# Patient Record
Sex: FEMALE | Race: WHITE | NOT HISPANIC OR LATINO
[De-identification: names, ages, dates, MRNs, and addresses within clinical notes are randomized per-mention and may not be internally consistent; named-entity substitution may affect disease eponyms.]

---

## 2017-12-06 ENCOUNTER — LABORATORY RESULT (OUTPATIENT)
Age: 57
End: 2017-12-06

## 2017-12-08 ENCOUNTER — APPOINTMENT (OUTPATIENT)
Dept: INTERNAL MEDICINE | Facility: CLINIC | Age: 57
End: 2017-12-08
Payer: COMMERCIAL

## 2017-12-08 VITALS
OXYGEN SATURATION: 96 % | HEART RATE: 55 BPM | DIASTOLIC BLOOD PRESSURE: 60 MMHG | HEIGHT: 58 IN | TEMPERATURE: 97.5 F | WEIGHT: 120 LBS | SYSTOLIC BLOOD PRESSURE: 100 MMHG | BODY MASS INDEX: 25.19 KG/M2

## 2017-12-08 DIAGNOSIS — Z83.511 FAMILY HISTORY OF GLAUCOMA: ICD-10-CM

## 2017-12-08 DIAGNOSIS — Z78.9 OTHER SPECIFIED HEALTH STATUS: ICD-10-CM

## 2017-12-08 DIAGNOSIS — Z87.891 PERSONAL HISTORY OF NICOTINE DEPENDENCE: ICD-10-CM

## 2017-12-08 LAB
HCV AB SER QL: NONREACTIVE
HCV S/CO RATIO: 0.09 S/CO
T4 FREE SERPL-MCNC: 1 NG/DL

## 2017-12-08 PROCEDURE — 99396 PREV VISIT EST AGE 40-64: CPT | Mod: 25

## 2017-12-08 PROCEDURE — 93000 ELECTROCARDIOGRAM COMPLETE: CPT

## 2017-12-08 RX ORDER — MULTIVITAMIN
TABLET ORAL
Refills: 0 | Status: ACTIVE | COMMUNITY

## 2017-12-08 RX ORDER — CALCIUM CARBONATE/VITAMIN D3 600MG-5MCG
600-200 TABLET ORAL
Refills: 0 | Status: ACTIVE | COMMUNITY

## 2017-12-20 ENCOUNTER — APPOINTMENT (OUTPATIENT)
Dept: ULTRASOUND IMAGING | Facility: CLINIC | Age: 57
End: 2017-12-20
Payer: COMMERCIAL

## 2017-12-20 ENCOUNTER — OUTPATIENT (OUTPATIENT)
Dept: OUTPATIENT SERVICES | Facility: HOSPITAL | Age: 57
LOS: 1 days | End: 2017-12-20
Payer: COMMERCIAL

## 2017-12-20 DIAGNOSIS — Z00.8 ENCOUNTER FOR OTHER GENERAL EXAMINATION: ICD-10-CM

## 2017-12-20 PROCEDURE — 76641 ULTRASOUND BREAST COMPLETE: CPT | Mod: 26,50

## 2017-12-20 PROCEDURE — 76641 ULTRASOUND BREAST COMPLETE: CPT

## 2018-07-03 ENCOUNTER — APPOINTMENT (OUTPATIENT)
Dept: INTERNAL MEDICINE | Facility: CLINIC | Age: 58
End: 2018-07-03
Payer: COMMERCIAL

## 2018-07-03 VITALS
BODY MASS INDEX: 24.14 KG/M2 | SYSTOLIC BLOOD PRESSURE: 130 MMHG | TEMPERATURE: 98 F | HEIGHT: 58 IN | DIASTOLIC BLOOD PRESSURE: 80 MMHG | WEIGHT: 115 LBS

## 2018-07-03 PROCEDURE — 99214 OFFICE O/P EST MOD 30 MIN: CPT

## 2018-07-25 PROBLEM — Z78.9 ALCOHOL USE: Status: ACTIVE | Noted: 2017-12-08

## 2020-03-04 ENCOUNTER — APPOINTMENT (OUTPATIENT)
Dept: INTERNAL MEDICINE | Facility: CLINIC | Age: 60
End: 2020-03-04
Payer: COMMERCIAL

## 2020-03-04 VITALS
HEIGHT: 58 IN | BODY MASS INDEX: 25.61 KG/M2 | TEMPERATURE: 97.6 F | SYSTOLIC BLOOD PRESSURE: 108 MMHG | WEIGHT: 122 LBS | DIASTOLIC BLOOD PRESSURE: 76 MMHG | OXYGEN SATURATION: 98 % | HEART RATE: 73 BPM

## 2020-03-04 DIAGNOSIS — Z11.59 ENCOUNTER FOR SCREENING FOR OTHER VIRAL DISEASES: ICD-10-CM

## 2020-03-04 DIAGNOSIS — Z87.828 PERSONAL HISTORY OF OTHER (HEALED) PHYSICAL INJURY AND TRAUMA: ICD-10-CM

## 2020-03-04 DIAGNOSIS — O24.419 GESTATIONAL DIABETES MELLITUS IN PREGNANCY, UNSPECIFIED CONTROL: ICD-10-CM

## 2020-03-04 DIAGNOSIS — Z86.32 PERSONAL HISTORY OF GESTATIONAL DIABETES: ICD-10-CM

## 2020-03-04 PROCEDURE — 93000 ELECTROCARDIOGRAM COMPLETE: CPT

## 2020-03-04 PROCEDURE — 99214 OFFICE O/P EST MOD 30 MIN: CPT | Mod: 25

## 2020-03-04 RX ORDER — MUPIROCIN 20 MG/G
2 OINTMENT TOPICAL
Qty: 1 | Refills: 0 | Status: DISCONTINUED | COMMUNITY
Start: 2017-12-08 | End: 2020-03-04

## 2020-03-04 RX ORDER — NAPROXEN 500 MG/1
500 TABLET ORAL
Qty: 30 | Refills: 0 | Status: DISCONTINUED | COMMUNITY
Start: 2018-07-03 | End: 2020-03-04

## 2020-03-04 NOTE — PLAN
[FreeTextEntry1] : pt will call me if not better or if worse .\par EKG- sinus mary grace 56\par palp- pt will observe and see when she dev palpitation. if more freq- pt to do holter.  pt to stop caffeine. \par f/u for CPE

## 2020-03-04 NOTE — HISTORY OF PRESENT ILLNESS
[Moderate] : moderate [Episodic] : episodic  [Cough] : cough [At Night] : at night [Improving] : improving [Congestion] : no congestion [Wheezing] : no wheezing [Sore Throat] : no sore throat [Chills] : no chills [Shortness Of Breath] : no shortness of breath [Earache] : no earache [Fever] : no fever [Fatigue] : not fatigue [FreeTextEntry1] : 2 mo [de-identified] : dry cough but occasionally productive [FreeTextEntry6] : 90% [FreeTextEntry5] : flonase [FreeTextEntry8] : pt was tx for the flu 12/19- just before [place].  not tx w flu\par cough lingered after flu.  but has continued to improve.  pt returned to - 5 wk( early FEB) after the flu and tx w flovent- took for 3-4 days , flonase- took it for 1 wk\par over the past 2 days- slightly worse- tickle in the throat. no nasal congestion\par no fever\par no GERD .  has post nasal drip\par pt c/o heart racing- 2mo- lasting less than a min.   better after stopping wine.  occurs every few days.  no assoc CP or SOB\par pt not sure if heart racing is assoc w hot flashes.

## 2020-03-04 NOTE — HISTORY OF PRESENT ILLNESS
[Moderate] : moderate [Episodic] : episodic  [Cough] : cough [At Night] : at night [Improving] : improving [Congestion] : no congestion [Wheezing] : no wheezing [Chills] : no chills [Sore Throat] : no sore throat [Earache] : no earache [Shortness Of Breath] : no shortness of breath [Fatigue] : not fatigue [Fever] : no fever [FreeTextEntry1] : 2 mo [de-identified] : dry cough but occasionally productive [FreeTextEntry5] : flonase [FreeTextEntry6] : 90% [FreeTextEntry8] : pt was tx for the flu 12/19- just before [place].  not tx w flu\par cough lingered after flu.  but has continued to improve.  pt returned to - 5 wk( early FEB) after the flu and tx w flovent- took for 3-4 days , flonase- took it for 1 wk\par over the past 2 days- slightly worse- tickle in the throat. no nasal congestion\par no fever\par no GERD .  has post nasal drip\par pt c/o heart racing- 2mo- lasting less than a min.   better after stopping wine.  occurs every few days.  no assoc CP or SOB\par pt not sure if heart racing is assoc w hot flashes.

## 2020-03-05 LAB
25(OH)D3 SERPL-MCNC: 36 NG/ML
ALBUMIN SERPL ELPH-MCNC: 4.6 G/DL
ALP BLD-CCNC: 61 U/L
ALT SERPL-CCNC: 16 U/L
ANION GAP SERPL CALC-SCNC: 12 MMOL/L
AST SERPL-CCNC: 19 U/L
BASOPHILS # BLD AUTO: 0.03 K/UL
BASOPHILS NFR BLD AUTO: 0.7 %
BILIRUB SERPL-MCNC: 0.8 MG/DL
BUN SERPL-MCNC: 16 MG/DL
CALCIUM SERPL-MCNC: 9.6 MG/DL
CHLORIDE SERPL-SCNC: 103 MMOL/L
CHOLEST SERPL-MCNC: 204 MG/DL
CHOLEST/HDLC SERPL: 2.6 RATIO
CO2 SERPL-SCNC: 27 MMOL/L
CREAT SERPL-MCNC: 0.75 MG/DL
EOSINOPHIL # BLD AUTO: 0.18 K/UL
EOSINOPHIL NFR BLD AUTO: 4.2 %
GLUCOSE SERPL-MCNC: 97 MG/DL
HCT VFR BLD CALC: 41.4 %
HDLC SERPL-MCNC: 80 MG/DL
HGB BLD-MCNC: 13.2 G/DL
IMM GRANULOCYTES NFR BLD AUTO: 0.2 %
LDLC SERPL CALC-MCNC: 112 MG/DL
LYMPHOCYTES # BLD AUTO: 1.19 K/UL
LYMPHOCYTES NFR BLD AUTO: 27.5 %
MAN DIFF?: NORMAL
MCHC RBC-ENTMCNC: 31.2 PG
MCHC RBC-ENTMCNC: 31.9 GM/DL
MCV RBC AUTO: 97.9 FL
MONOCYTES # BLD AUTO: 0.37 K/UL
MONOCYTES NFR BLD AUTO: 8.6 %
NEUTROPHILS # BLD AUTO: 2.54 K/UL
NEUTROPHILS NFR BLD AUTO: 58.8 %
PLATELET # BLD AUTO: 266 K/UL
POTASSIUM SERPL-SCNC: 4.5 MMOL/L
PROT SERPL-MCNC: 6.7 G/DL
RBC # BLD: 4.23 M/UL
RBC # FLD: 12 %
SAVE SPECIMEN: NORMAL
SODIUM SERPL-SCNC: 142 MMOL/L
T3 SERPL-MCNC: 91 NG/DL
T4 FREE SERPL-MCNC: 1.1 NG/DL
TRIGL SERPL-MCNC: 59 MG/DL
TSH SERPL-ACNC: 1.56 UIU/ML
WBC # FLD AUTO: 4.32 K/UL

## 2020-03-10 ENCOUNTER — APPOINTMENT (OUTPATIENT)
Dept: INTERNAL MEDICINE | Facility: CLINIC | Age: 60
End: 2020-03-10
Payer: COMMERCIAL

## 2020-03-10 VITALS — SYSTOLIC BLOOD PRESSURE: 120 MMHG | DIASTOLIC BLOOD PRESSURE: 80 MMHG

## 2020-03-10 VITALS
OXYGEN SATURATION: 98 % | TEMPERATURE: 97.5 F | SYSTOLIC BLOOD PRESSURE: 140 MMHG | BODY MASS INDEX: 25.61 KG/M2 | WEIGHT: 122 LBS | HEART RATE: 65 BPM | DIASTOLIC BLOOD PRESSURE: 80 MMHG | HEIGHT: 58 IN

## 2020-03-10 DIAGNOSIS — Z91.89 OTHER SPECIFIED PERSONAL RISK FACTORS, NOT ELSEWHERE CLASSIFIED: ICD-10-CM

## 2020-03-10 DIAGNOSIS — M79.672 PAIN IN LEFT FOOT: ICD-10-CM

## 2020-03-10 DIAGNOSIS — R09.82 POSTNASAL DRIP: ICD-10-CM

## 2020-03-10 DIAGNOSIS — R05 COUGH: ICD-10-CM

## 2020-03-10 DIAGNOSIS — M25.512 PAIN IN LEFT SHOULDER: ICD-10-CM

## 2020-03-10 PROCEDURE — 94010 BREATHING CAPACITY TEST: CPT

## 2020-03-10 PROCEDURE — 99396 PREV VISIT EST AGE 40-64: CPT | Mod: 25

## 2020-03-10 NOTE — PHYSICAL EXAM
[No Acute Distress] : no acute distress [Well Nourished] : well nourished [Well Developed] : well developed [Well-Appearing] : well-appearing [Normal Sclera/Conjunctiva] : normal sclera/conjunctiva [PERRL] : pupils equal round and reactive to light [Normal Outer Ear/Nose] : the outer ears and nose were normal in appearance [Normal Oropharynx] : the oropharynx was normal [Normal TMs] : both tympanic membranes were normal [No Lymphadenopathy] : no lymphadenopathy [Supple] : supple [Thyroid Normal, No Nodules] : the thyroid was normal and there were no nodules present [No Respiratory Distress] : no respiratory distress  [No Accessory Muscle Use] : no accessory muscle use [Clear to Auscultation] : lungs were clear to auscultation bilaterally [Normal Rate] : normal rate  [Regular Rhythm] : with a regular rhythm [Normal S1, S2] : normal S1 and S2 [No Murmur] : no murmur heard [No Carotid Bruits] : no carotid bruits [No Edema] : there was no peripheral edema [Soft] : abdomen soft [Non Tender] : non-tender [Non-distended] : non-distended [Normal Bowel Sounds] : normal bowel sounds [Normal Supraclavicular Nodes] : no supraclavicular lymphadenopathy [Normal Axillary Nodes] : no axillary lymphadenopathy [Normal Posterior Cervical Nodes] : no posterior cervical lymphadenopathy [Normal Anterior Cervical Nodes] : no anterior cervical lymphadenopathy [Normal Inguinal Nodes] : no inguinal lymphadenopathy [Grossly Normal Strength/Tone] : grossly normal strength/tone [No Focal Deficits] : no focal deficits [Normal Gait] : normal gait [Normal Affect] : the affect was normal [Normal Insight/Judgement] : insight and judgment were intact [Normal Appearance] : normal in appearance [No Masses] : no palpable masses [No Axillary Lymphadenopathy] : no axillary lymphadenopathy

## 2020-03-10 NOTE — HISTORY OF PRESENT ILLNESS
[FreeTextEntry1] : CPE [de-identified] : vaccine- tetanus 2012- as per pt.  pt had flu.  \par diet- reviewed healthy diet. \par exercise- yoga- walking -no CP or SOB\par cough- sporadic.  post nasal drip.  stopped flonase due to epistaxis.  better w singular. - started 1 wk ago\par less congestion on singular.\par pt denies GERD symptoms\par palpitation resolved w stopping wine

## 2020-03-10 NOTE — HEALTH RISK ASSESSMENT
[Yes] : Yes [4 or more  times a week (4 pts)] : 4 or more  times a week (4 points) [1 or 2 (0 pts)] : 1 or 2 (0 points) [Never (0 pts)] : Never (0 points) [No] : In the past 12 months have you used drugs other than those required for medical reasons? No [0] : 2) Feeling down, depressed, or hopeless: Not at all (0) [Patient reported mammogram was normal] : Patient reported mammogram was normal [Patient reported PAP Smear was normal] : Patient reported PAP Smear was normal [Patient reported bone density results were abnormal] : Patient reported bone density results were abnormal [HIV test declined] : HIV test declined [With Patient/Caregiver] : With Patient/Caregiver [Designated Healthcare Proxy] : Designated healthcare proxy [Relationship: ___] : Relationship: [unfilled] [] : No [Audit-CScore] : 4 [MammogramDate] : 10/18 [MammogramComments] : as per pt [PapSmearDate] : 10/18 [PapSmearComments] : as per ptpt will make f/u w her GYN [BoneDensityDate] : 10/16 [BoneDensityComments] : osteopenia [ColonoscopyComments] : last colonoscopy- 13 yr ago [de-identified] : see opth 2 yr ago [de-identified] : sees dentist regular [AdvancecareDate] : 03/20

## 2020-04-03 ENCOUNTER — RX RENEWAL (OUTPATIENT)
Age: 60
End: 2020-04-03

## 2021-07-13 ENCOUNTER — NON-APPOINTMENT (OUTPATIENT)
Age: 61
End: 2021-07-13

## 2021-07-13 ENCOUNTER — APPOINTMENT (OUTPATIENT)
Dept: INTERNAL MEDICINE | Facility: CLINIC | Age: 61
End: 2021-07-13
Payer: COMMERCIAL

## 2021-07-13 VITALS
SYSTOLIC BLOOD PRESSURE: 118 MMHG | BODY MASS INDEX: 25.19 KG/M2 | WEIGHT: 120 LBS | TEMPERATURE: 97.5 F | OXYGEN SATURATION: 98 % | DIASTOLIC BLOOD PRESSURE: 76 MMHG | HEIGHT: 58 IN | HEART RATE: 66 BPM

## 2021-07-13 DIAGNOSIS — Z01.84 ENCOUNTER FOR ANTIBODY RESPONSE EXAMINATION: ICD-10-CM

## 2021-07-13 DIAGNOSIS — R00.2 PALPITATIONS: ICD-10-CM

## 2021-07-13 DIAGNOSIS — R23.2 FLUSHING: ICD-10-CM

## 2021-07-13 LAB
25(OH)D3 SERPL-MCNC: 35.1 NG/ML
ALBUMIN SERPL ELPH-MCNC: 4.5 G/DL
ALP BLD-CCNC: 69 U/L
ALT SERPL-CCNC: 16 U/L
ANION GAP SERPL CALC-SCNC: 10 MMOL/L
AST SERPL-CCNC: 19 U/L
BASOPHILS # BLD AUTO: 0.04 K/UL
BASOPHILS NFR BLD AUTO: 0.9 %
BILIRUB SERPL-MCNC: 0.6 MG/DL
BUN SERPL-MCNC: 12 MG/DL
CALCIUM SERPL-MCNC: 9.2 MG/DL
CHLORIDE SERPL-SCNC: 100 MMOL/L
CHOLEST SERPL-MCNC: 217 MG/DL
CO2 SERPL-SCNC: 27 MMOL/L
CREAT SERPL-MCNC: 0.72 MG/DL
EOSINOPHIL # BLD AUTO: 0.16 K/UL
EOSINOPHIL NFR BLD AUTO: 3.5 %
ESTIMATED AVERAGE GLUCOSE: 100 MG/DL
GLUCOSE SERPL-MCNC: 95 MG/DL
HBA1C MFR BLD HPLC: 5.1 %
HCT VFR BLD CALC: 38.2 %
HDLC SERPL-MCNC: 89 MG/DL
HGB BLD-MCNC: 12.7 G/DL
IMM GRANULOCYTES NFR BLD AUTO: 0.2 %
LDLC SERPL CALC-MCNC: 116 MG/DL
LDLC SERPL DIRECT ASSAY-MCNC: 118 MG/DL
LYMPHOCYTES # BLD AUTO: 1 K/UL
LYMPHOCYTES NFR BLD AUTO: 21.8 %
MAN DIFF?: NORMAL
MCHC RBC-ENTMCNC: 32 PG
MCHC RBC-ENTMCNC: 33.2 GM/DL
MCV RBC AUTO: 96.2 FL
MONOCYTES # BLD AUTO: 0.41 K/UL
MONOCYTES NFR BLD AUTO: 9 %
NEUTROPHILS # BLD AUTO: 2.96 K/UL
NEUTROPHILS NFR BLD AUTO: 64.6 %
NONHDLC SERPL-MCNC: 128 MG/DL
PLATELET # BLD AUTO: 268 K/UL
POTASSIUM SERPL-SCNC: 4.9 MMOL/L
PROT SERPL-MCNC: 6.5 G/DL
RBC # BLD: 3.97 M/UL
RBC # FLD: 12.1 %
SAVE SPECIMEN: NORMAL
SODIUM SERPL-SCNC: 137 MMOL/L
T4 FREE SERPL-MCNC: 1 NG/DL
TRIGL SERPL-MCNC: 62 MG/DL
TSH SERPL-ACNC: 1.76 UIU/ML
WBC # FLD AUTO: 4.58 K/UL

## 2021-07-13 PROCEDURE — 99072 ADDL SUPL MATRL&STAF TM PHE: CPT

## 2021-07-13 PROCEDURE — 93000 ELECTROCARDIOGRAM COMPLETE: CPT

## 2021-07-13 PROCEDURE — 99396 PREV VISIT EST AGE 40-64: CPT | Mod: 25

## 2021-07-13 RX ORDER — AZELASTINE HYDROCHLORIDE 137 UG/1
137 SPRAY, METERED NASAL DAILY
Qty: 1 | Refills: 3 | Status: DISCONTINUED | COMMUNITY
Start: 2020-03-10 | End: 2021-07-13

## 2021-07-13 RX ORDER — FLUTICASONE PROPIONATE 50 UG/1
50 SPRAY, METERED NASAL DAILY
Qty: 1 | Refills: 1 | Status: DISCONTINUED | COMMUNITY
Start: 2020-03-04 | End: 2021-07-13

## 2021-07-13 RX ORDER — MONTELUKAST 10 MG/1
10 TABLET, FILM COATED ORAL DAILY
Qty: 1 | Refills: 0 | Status: DISCONTINUED | COMMUNITY
Start: 2020-03-04 | End: 2021-07-13

## 2021-07-13 RX ORDER — ASCORBIC ACID 500 MG
500 TABLET ORAL
Refills: 0 | Status: DISCONTINUED | COMMUNITY
End: 2021-07-13

## 2021-07-13 RX ORDER — LORATADINE 5 MG/5 ML
10 SOLUTION, ORAL ORAL
Qty: 30 | Refills: 0 | Status: DISCONTINUED | COMMUNITY
Start: 2020-03-10 | End: 2021-07-13

## 2021-07-13 NOTE — PHYSICAL EXAM
[No Acute Distress] : no acute distress [Well Nourished] : well nourished [Well Developed] : well developed [Well-Appearing] : well-appearing [Normal Sclera/Conjunctiva] : normal sclera/conjunctiva [PERRL] : pupils equal round and reactive to light [Normal Outer Ear/Nose] : the outer ears and nose were normal in appearance [Normal Oropharynx] : the oropharynx was normal [Normal TMs] : both tympanic membranes were normal [No Lymphadenopathy] : no lymphadenopathy [Supple] : supple [Thyroid Normal, No Nodules] : the thyroid was normal and there were no nodules present [No Respiratory Distress] : no respiratory distress  [No Accessory Muscle Use] : no accessory muscle use [Clear to Auscultation] : lungs were clear to auscultation bilaterally [Normal Rate] : normal rate  [Regular Rhythm] : with a regular rhythm [Normal S1, S2] : normal S1 and S2 [No Murmur] : no murmur heard [No Carotid Bruits] : no carotid bruits [No Edema] : there was no peripheral edema [Soft] : abdomen soft [Non Tender] : non-tender [Non-distended] : non-distended [No Masses] : no abdominal mass palpated [Normal Bowel Sounds] : normal bowel sounds [Normal Supraclavicular Nodes] : no supraclavicular lymphadenopathy [Normal Axillary Nodes] : no axillary lymphadenopathy [Normal Posterior Cervical Nodes] : no posterior cervical lymphadenopathy [Normal Anterior Cervical Nodes] : no anterior cervical lymphadenopathy [Grossly Normal Strength/Tone] : grossly normal strength/tone [No Focal Deficits] : no focal deficits [Normal Gait] : normal gait [Normal Affect] : the affect was normal [Normal Insight/Judgement] : insight and judgment were intact [de-identified] : no mass under the tongue

## 2021-07-13 NOTE — HISTORY OF PRESENT ILLNESS
[FreeTextEntry1] : CPE [de-identified] : vaccine- tetanus 2012- as per pt. pt waiting to get COVID vac  \par diet- reviewed healthy diet. \par exercise-- walking -no CP or SOB\par reviewed 6/12/21 labs\par tinnitus- occasional- hi pitched ringing.  no buzzing or rumbling sound\par pt states she felt fullness under her tongue.pt was evaluated by ENT who thought it was due to dental work.  pt has appt w oral surgeon tomorrow.  pt doesn't feel it today\par palpitation- intermittent. 1-2 yr. usually when she is sleeping. occurs 1/wk.  duration 1 min.  also assoc w hot flashes.\par no assoc SOB, CP or dizziness. 3-4 cups coffee/ day

## 2021-07-13 NOTE — PLAN
[FreeTextEntry1] : palpitation- pt doesn't want to do holter now.  states she wants to wait 1 mo and observe. decr caffeine\par pt to release the mammo report\par EKG- sinus dieter 57

## 2021-07-13 NOTE — HEALTH RISK ASSESSMENT
[Yes] : Yes [4 or more  times a week (4 pts)] : 4 or more  times a week (4 points) [1 or 2 (0 pts)] : 1 or 2 (0 points) [Never (0 pts)] : Never (0 points) [No] : In the past 12 months have you used drugs other than those required for medical reasons? No [0] : 2) Feeling down, depressed, or hopeless: Not at all (0) [Patient reported mammogram was normal] : Patient reported mammogram was normal [Patient reported PAP Smear was normal] : Patient reported PAP Smear was normal [Patient reported bone density results were abnormal] : Patient reported bone density results were abnormal [HIV test declined] : HIV test declined [Hepatitis C test declined] : Hepatitis C test declined [] : No [Audit-CScore] : 4 [MammogramDate] : 04/21 [MammogramComments] : as per pt [PapSmearDate] : 04/20 [PapSmearComments] : as per pt. pt will make f/u w her GYN [BoneDensityDate] : 10/16 [BoneDensityComments] : osteopenia [ColonoscopyComments] : last colonoscopy- 13 yr ago [de-identified] : see opth 1 mo  ago [de-identified] : sees dentist regular [With Patient/Caregiver] : , with patient/caregiver [Designated Healthcare Proxy] : Designated healthcare proxy [Relationship: ___] : Relationship: [unfilled] [AdvancecareDate] : 07/21

## 2021-07-16 LAB
COVID-19 NUCLEOCAPSID  GAM ANTIBODY INTERPRETATION: NEGATIVE
SARS-COV-2 AB SERPL QL IA: 0.1 INDEX

## 2022-05-19 ENCOUNTER — APPOINTMENT (OUTPATIENT)
Dept: INTERNAL MEDICINE | Facility: CLINIC | Age: 62
End: 2022-05-19
Payer: COMMERCIAL

## 2022-05-19 VITALS
HEIGHT: 58 IN | TEMPERATURE: 97.8 F | BODY MASS INDEX: 25.19 KG/M2 | DIASTOLIC BLOOD PRESSURE: 80 MMHG | SYSTOLIC BLOOD PRESSURE: 110 MMHG | WEIGHT: 120 LBS | OXYGEN SATURATION: 98 % | HEART RATE: 68 BPM

## 2022-05-19 PROCEDURE — 99214 OFFICE O/P EST MOD 30 MIN: CPT

## 2022-05-19 NOTE — HISTORY OF PRESENT ILLNESS
[FreeTextEntry1] : leg pain [de-identified] : \par \par \par palpitation-  resolved w decr caffeine intake. 1 cup coffee in AM, 1/2 cup in the afternoon\par b/l leg pain- tightness, stiffness - 2 wk- no swelling- - only at night- intermittent 6/10 severity. tylenol.  last \par  occasional hip\par no assoc SOB, CP or dizziness. 3-4 cups coffee/ day\par pt c/o upper back, neck pain- 2-3 wk.  no trauma. pain on turning her head\par L shoulder pain- for sev yr-w certain movement.  limited ROM

## 2022-05-19 NOTE — PHYSICAL EXAM
[No Edema] : there was no peripheral edema [de-identified] : good ROM of hip - discomfort  on ext rotation.  good ROM of shoulder- mild tenderness.  good ROM of neck -mild tenderness L trapezius

## 2022-05-26 ENCOUNTER — APPOINTMENT (OUTPATIENT)
Dept: ORTHOPEDIC SURGERY | Facility: CLINIC | Age: 62
End: 2022-05-26
Payer: COMMERCIAL

## 2022-05-26 VITALS — HEART RATE: 68 BPM | DIASTOLIC BLOOD PRESSURE: 80 MMHG | SYSTOLIC BLOOD PRESSURE: 110 MMHG | HEIGHT: 58.5 IN

## 2022-05-26 DIAGNOSIS — M25.512 PAIN IN LEFT SHOULDER: ICD-10-CM

## 2022-05-26 PROCEDURE — 73030 X-RAY EXAM OF SHOULDER: CPT | Mod: LT

## 2022-05-26 PROCEDURE — 99203 OFFICE O/P NEW LOW 30 MIN: CPT

## 2022-05-31 PROBLEM — M25.512 SHOULDER PAIN, LEFT: Status: ACTIVE | Noted: 2022-05-19

## 2022-05-31 NOTE — DISCUSSION/SUMMARY
[de-identified] : 62-year-old female with left shoulder calcific tendinitis\par \par The patient presents today with acute onset of left shoulder pain consistent with a clinical diagnosis of calcific tendinopathy. Radiographs show a calcification at the rotator cuff insertion. Clinically, the patient specific point tenderness to this location as well as positive impingement signs. I discussed the pathophysiology of the diagnosis and the distinction between the resorptive and formative phase.\par \par I discussed the treatment of calcific tendinitis with the patient including nonoperative management as first line treatment. Anti-inflammatory medications (NSAID's) with physical therapy for strengthening and conditioning of the joint to preserve shoulder range of motion is typically required. Corticosteroid injection may be indicated for refractory cases and for acute symptomatic pain relief. If nonoperative management fails, arthroscopic debridement with possible rotator cuff repair if damaged during surgery may be required for recalcitrant cases.\par \par The patient elected to proceed with nonoperative management including trial of PT. \par \par We will followup in 2-3mos as needed.

## 2022-05-31 NOTE — HISTORY OF PRESENT ILLNESS
[de-identified] : 62 year old female presents with left shoulder pain x 2 years. No injury reported. She thinks that she may have aggravated her shoulder in Yoga. The pain is brought on with reaching forward. Taking Mobic for relief.  She has developed multiple joint pains recently but has been having shoulder pain for years.  Symptoms are manageable, but presents for an evaluation to ensure that there is no significant internal structural damage.\par \par The patient's past medical history, past surgical history, medications and allergies were reviewed by me today with the patient and documented accordingly. In addition, the patient's family and social history, which were noncontributory to this visit, were reviewed also.

## 2022-05-31 NOTE — PHYSICAL EXAM
[de-identified] : Oriented to time, place, person\par Mood: Normal\par Affect: Normal\par Appearance: Healthy, well appearing, no acute distress.\par Gait: Normal\par Assistive Devices: None\par \par Left  shoulder exam\par \par Inspection: No malalignment, No defects, No atrophy\par Skin: No masses, No lesions\par Neck: Negative Spurling's, full ROM, no pain with ROM\par AROM: FF to 180, abduction to 90, ER to 60, IR to lower lumbar\par Painful arc ROM: Pain with range of motion\par Tenderness: no bicipital tenderness, +tenderness to the greater tuberosity/RTC insertion, no anterior shoulder/lesser tuberosity tenderness\par Strength: 5/5 ER, 5/5 IR in adduction, 5/5 supraspinatus testing, +Faulk's test\par AC Joint: No ttp/pain with cross arm testing\par Biceps: Speed Negative, Yergusons Negative\par Impingement test: +Su, +Neer \par Stability: Stable\par Vasc: 2+ radial pulse\par Neuro: AIN, PIN, Ulnar nerve intact to motor\par Sensation: Intact to light touch throughout [de-identified] : The following radiographs were ordered and read by me during this patients visit. I reviewed each radiograph in detail with the patient and discussed the findings as highlighted below.\par  \par 3 views of the left shoulder were obtained today, 5/26/22, that show no acute fracture or dislocation. There is no glenohumeral and no AC joint degenerative change seen. Type II acromion. There is no significant malalignment.  Evidence of calcific tendinopathy

## 2022-06-06 ENCOUNTER — APPOINTMENT (OUTPATIENT)
Dept: RADIOLOGY | Facility: CLINIC | Age: 62
End: 2022-06-06
Payer: COMMERCIAL

## 2022-06-06 ENCOUNTER — TRANSCRIPTION ENCOUNTER (OUTPATIENT)
Age: 62
End: 2022-06-06

## 2022-06-06 PROCEDURE — 73521 X-RAY EXAM HIPS BI 2 VIEWS: CPT

## 2022-06-13 ENCOUNTER — NON-APPOINTMENT (OUTPATIENT)
Age: 62
End: 2022-06-13

## 2022-06-13 ENCOUNTER — APPOINTMENT (OUTPATIENT)
Dept: ULTRASOUND IMAGING | Facility: CLINIC | Age: 62
End: 2022-06-13
Payer: COMMERCIAL

## 2022-06-13 ENCOUNTER — APPOINTMENT (OUTPATIENT)
Dept: INTERNAL MEDICINE | Facility: CLINIC | Age: 62
End: 2022-06-13
Payer: COMMERCIAL

## 2022-06-13 ENCOUNTER — OUTPATIENT (OUTPATIENT)
Dept: OUTPATIENT SERVICES | Facility: HOSPITAL | Age: 62
LOS: 1 days | End: 2022-06-13
Payer: COMMERCIAL

## 2022-06-13 VITALS
BODY MASS INDEX: 24.56 KG/M2 | SYSTOLIC BLOOD PRESSURE: 130 MMHG | DIASTOLIC BLOOD PRESSURE: 80 MMHG | HEART RATE: 57 BPM | OXYGEN SATURATION: 96 % | HEIGHT: 58 IN | WEIGHT: 117 LBS | TEMPERATURE: 98 F

## 2022-06-13 VITALS — SYSTOLIC BLOOD PRESSURE: 130 MMHG | DIASTOLIC BLOOD PRESSURE: 90 MMHG

## 2022-06-13 DIAGNOSIS — R51.9 HEADACHE, UNSPECIFIED: ICD-10-CM

## 2022-06-13 DIAGNOSIS — R60.9 EDEMA, UNSPECIFIED: ICD-10-CM

## 2022-06-13 PROCEDURE — 93970 EXTREMITY STUDY: CPT

## 2022-06-13 PROCEDURE — 99214 OFFICE O/P EST MOD 30 MIN: CPT

## 2022-06-13 PROCEDURE — 93970 EXTREMITY STUDY: CPT | Mod: 26

## 2022-06-13 RX ORDER — MELOXICAM 15 MG/1
15 TABLET ORAL
Qty: 15 | Refills: 0 | Status: DISCONTINUED | COMMUNITY
Start: 2022-05-19 | End: 2022-06-13

## 2022-06-13 NOTE — PHYSICAL EXAM
[No Carotid Bruits] : no carotid bruits [de-identified] : R calf slightly larger than L calf.  no tenderness over the temporal arteries.  no supraclav bruits

## 2022-06-13 NOTE — HISTORY OF PRESENT ILLNESS
[FreeTextEntry1] : Myalgia, swollen leg [de-identified] : b/l hip pain/groin pain/ leg pain- rx w meloxicam-.  reviewed 6/16/22 xray-nl.  Meloxicam helped for short period.   pt went to - steroid was started on 6/11/22 after seen at  [facility]. hip pain almost resolved after steroids  reviewed labs from 6/10/22- [facility].  neg Uric Acid, lyme\par shoulder pain.  reviewed 5/26/22  Ortho notes- ref to PT .  shoulder pain almost gone after steroid\par rec DEXA\par b/l leg pain- upper and lower leg- tightness, stiffness, throbbing -  -all day but worse at night- intermittent 6/10 severity. tylenol. \par pt c/o swelling L leg more R leg- noted on Friday-   The swelling is slightly better on Pred but pain is resolved\par last plane trip- 2 hr mid April.  no other episodes of prolonged sitting or laying in leg.  no hx of blood clot or family hx of blood clots\par no vision chg, \par c/o frontal HA- noted over the past 2-3 days. pressure- started after Pred. pt states she had other diffuse arthralgia/ myalgias that she didn't noticed the HA until the other pain was resolved. Jaw pain at night- since 10/2021- states she clenches her jaw.  - no chg in jaw pain\par hx of ocular migraines\par pt was given appt for Rheum today at 1:30 pm today

## 2022-06-22 ENCOUNTER — APPOINTMENT (OUTPATIENT)
Dept: INTERNAL MEDICINE | Facility: CLINIC | Age: 62
End: 2022-06-22
Payer: COMMERCIAL

## 2022-06-22 VITALS
BODY MASS INDEX: 25.19 KG/M2 | WEIGHT: 120 LBS | HEIGHT: 58 IN | DIASTOLIC BLOOD PRESSURE: 70 MMHG | HEART RATE: 78 BPM | OXYGEN SATURATION: 99 % | TEMPERATURE: 97.2 F | SYSTOLIC BLOOD PRESSURE: 110 MMHG

## 2022-06-22 PROCEDURE — 99214 OFFICE O/P EST MOD 30 MIN: CPT

## 2022-06-22 NOTE — HISTORY OF PRESENT ILLNESS
[FreeTextEntry1] : arthralgia [de-identified] : b/l hip pain/groin pain/ leg pain-now resolved. pt still on steroid was started on 6/11/22 by UC  reviewed labs from 6/10/22- [facility].  neg Uric Acid, lyme.  reviewed 6/16/22 labs from Rheum- +WENDY\par  5/26/22  Ortho notes- ref to PT .  shoulder pain almost gone after steroid\par rec DEXA\par pt leg swelling resolved-   The swelling is slightly better on Pred but pain is resolved\par last plane trip- 2 hr mid April.  no other episodes of prolonged sitting or laying in leg.  no hx of blood clot or family hx of blood clots\par no vision chg, .  reviewed 6/13/22 venous US- neg CVT, 6/6/22 xray - no fx\par HA- resolved. No Jaw pain\par pt was seen by Rheum - Dr. Kay Chandra

## 2022-07-20 ENCOUNTER — APPOINTMENT (OUTPATIENT)
Dept: INTERNAL MEDICINE | Facility: CLINIC | Age: 62
End: 2022-07-20

## 2022-09-23 ENCOUNTER — APPOINTMENT (OUTPATIENT)
Dept: RADIOLOGY | Facility: CLINIC | Age: 62
End: 2022-09-23

## 2022-09-23 PROCEDURE — 77080 DXA BONE DENSITY AXIAL: CPT

## 2022-09-28 ENCOUNTER — NON-APPOINTMENT (OUTPATIENT)
Age: 62
End: 2022-09-28

## 2022-11-18 ENCOUNTER — NON-APPOINTMENT (OUTPATIENT)
Age: 62
End: 2022-11-18

## 2022-11-18 ENCOUNTER — APPOINTMENT (OUTPATIENT)
Dept: INTERNAL MEDICINE | Facility: CLINIC | Age: 62
End: 2022-11-18

## 2022-11-18 VITALS
OXYGEN SATURATION: 98 % | BODY MASS INDEX: 25.19 KG/M2 | WEIGHT: 120 LBS | HEIGHT: 58 IN | DIASTOLIC BLOOD PRESSURE: 60 MMHG | TEMPERATURE: 98 F | SYSTOLIC BLOOD PRESSURE: 100 MMHG | HEART RATE: 75 BPM

## 2022-11-18 DIAGNOSIS — M85.80 OTHER SPECIFIED DISORDERS OF BONE DENSITY AND STRUCTURE, UNSPECIFIED SITE: ICD-10-CM

## 2022-11-18 DIAGNOSIS — Z31.83 ENCOUNTER FOR ASSISTED REPRODUCTIVE FERTILITY PROCEDURE CYCLE: ICD-10-CM

## 2022-11-18 DIAGNOSIS — R09.89 OTHER SPECIFIED SYMPTOMS AND SIGNS INVOLVING THE CIRCULATORY AND RESPIRATORY SYSTEMS: ICD-10-CM

## 2022-11-18 DIAGNOSIS — R60.9 EDEMA, UNSPECIFIED: ICD-10-CM

## 2022-11-18 PROCEDURE — 93000 ELECTROCARDIOGRAM COMPLETE: CPT

## 2022-11-18 PROCEDURE — 99396 PREV VISIT EST AGE 40-64: CPT | Mod: 25

## 2022-11-18 NOTE — PHYSICAL EXAM
[No Acute Distress] : no acute distress [Well Nourished] : well nourished [Well Developed] : well developed [Well-Appearing] : well-appearing [Normal Sclera/Conjunctiva] : normal sclera/conjunctiva [PERRL] : pupils equal round and reactive to light [Normal Outer Ear/Nose] : the outer ears and nose were normal in appearance [Normal Oropharynx] : the oropharynx was normal [Normal TMs] : both tympanic membranes were normal [No Lymphadenopathy] : no lymphadenopathy [Supple] : supple [Thyroid Normal, No Nodules] : the thyroid was normal and there were no nodules present [No Respiratory Distress] : no respiratory distress  [No Accessory Muscle Use] : no accessory muscle use [Clear to Auscultation] : lungs were clear to auscultation bilaterally [Normal Rate] : normal rate  [Regular Rhythm] : with a regular rhythm [Normal S1, S2] : normal S1 and S2 [No Murmur] : no murmur heard [No Carotid Bruits] : no carotid bruits [No Edema] : there was no peripheral edema [Soft] : abdomen soft [Non Tender] : non-tender [Non-distended] : non-distended [Normal Bowel Sounds] : normal bowel sounds [Normal Supraclavicular Nodes] : no supraclavicular lymphadenopathy [Normal Axillary Nodes] : no axillary lymphadenopathy [Normal Posterior Cervical Nodes] : no posterior cervical lymphadenopathy [Normal Anterior Cervical Nodes] : no anterior cervical lymphadenopathy [Grossly Normal Strength/Tone] : grossly normal strength/tone [No Focal Deficits] : no focal deficits [Normal Gait] : normal gait [Normal Affect] : the affect was normal [Normal Insight/Judgement] : insight and judgment were intact [Normal Appearance] : normal in appearance [No Masses] : no palpable masses [No Axillary Lymphadenopathy] : no axillary lymphadenopathy [de-identified] : no mass under the tongue [de-identified] : decr pedal pulse L. R +1 edema. R leg is more swollen than

## 2022-11-18 NOTE — HISTORY OF PRESENT ILLNESS
[FreeTextEntry1] : CPE [de-identified] : vaccine- flu, tdap, shingrix- pt doesn't want vaccines \par diet- non compliant w she is dieting\par exercise- not recently\par b/l hip pain/groin pain/ leg pain-now resolved. pt still on steroid was started on 6/11/22 by   reviewed labs from reviewed 10/18/22 labs- pt states she was dx w polymyalgia rheum\par  5/26/22  Ortho notes- ref to PT .  shoulder pain almost gone after steroid\par  Wakes in the AM and b/l  legs are painful, heaviness until she takes prednisone.  had neg US venous 2 wk ago as per pt\par pt leg swelling  intermttent    reviewed 10/18/22 US - neg DVT R bakers cyst- pt c/o tightness when bending her knee\par HA- resolved. No Jaw pain\par pt was seen by Rheum - Dr. Kay Chandra

## 2022-11-18 NOTE — HEALTH RISK ASSESSMENT
[Yes] : Yes [4 or more  times a week (4 pts)] : 4 or more  times a week (4 points) [1 or 2 (0 pts)] : 1 or 2 (0 points) [Never (0 pts)] : Never (0 points) [No] : In the past 12 months have you used drugs other than those required for medical reasons? No [0] : 2) Feeling down, depressed, or hopeless: Not at all (0) [Patient reported mammogram was normal] : Patient reported mammogram was normal [Patient reported PAP Smear was normal] : Patient reported PAP Smear was normal [Patient reported bone density results were abnormal] : Patient reported bone density results were abnormal [HIV test declined] : HIV test declined [Hepatitis C test declined] : Hepatitis C test declined [With Patient/Caregiver] : , with patient/caregiver [Designated Healthcare Proxy] : Designated healthcare proxy [Relationship: ___] : Relationship: [unfilled] [Former] : Former [PHQ-2 Negative - No further assessment needed] : PHQ-2 Negative - No further assessment needed [YearQuit] : in her 20's [Audit-CScore] : 4 [[id number]] : 0 [MammogramDate] : 10/22 [MammogramComments] : as per pt.  rec US [PapSmearDate] : 12/21 [PapSmearComments] : as per pt. pt has appt for Dec [BoneDensityDate] : 09/22 [BoneDensityComments] : osteoporosis [ColonoscopyComments] : last colonoscopy- 13 yr ago- pt has appt [de-identified] : see opth  3 wk   ago [de-identified] : sees dentist regular- 2 wk [AdvancecareDate] : 11/22

## 2022-11-28 ENCOUNTER — APPOINTMENT (OUTPATIENT)
Dept: ORTHOPEDIC SURGERY | Facility: CLINIC | Age: 62
End: 2022-11-28

## 2022-11-28 VITALS
SYSTOLIC BLOOD PRESSURE: 131 MMHG | HEART RATE: 79 BPM | WEIGHT: 119 LBS | HEIGHT: 59 IN | DIASTOLIC BLOOD PRESSURE: 75 MMHG | BODY MASS INDEX: 23.99 KG/M2

## 2022-11-28 PROCEDURE — 73562 X-RAY EXAM OF KNEE 3: CPT | Mod: RT

## 2022-11-28 PROCEDURE — 99213 OFFICE O/P EST LOW 20 MIN: CPT

## 2022-12-14 ENCOUNTER — APPOINTMENT (OUTPATIENT)
Dept: GASTROENTEROLOGY | Facility: CLINIC | Age: 62
End: 2022-12-14

## 2022-12-14 PROCEDURE — 99203 OFFICE O/P NEW LOW 30 MIN: CPT

## 2022-12-14 NOTE — PHYSICAL EXAM
[Alert] : alert [Normal Voice/Communication] : normal voice/communication [Healthy Appearing] : healthy appearing [No Acute Distress] : no acute distress [Sclera] : the sclera and conjunctiva were normal [Hearing Threshold Finger Rub Not [place]] : hearing was normal [Normal Appearance] : the appearance of the neck was normal [No Respiratory Distress] : no respiratory distress [No Acc Muscle Use] : no accessory muscle use [Respiration, Rhythm And Depth] : normal respiratory rhythm and effort [Heart Rate And Rhythm] : heart rate was normal and rhythm regular [None] : no edema [Bowel Sounds] : normal bowel sounds [Abdomen Tenderness] : non-tender [No Masses] : no abdominal mass palpated [Abdomen Soft] : soft [] : no hepatosplenomegaly [Cervical Lymph Nodes Enlarged Posterior Bilaterally] : no posterior cervical lymphadenopathy [No CVA Tenderness] : no CVA  tenderness [No Spinal Tenderness] : no spinal tenderness [Abnormal Walk] : normal gait [Normal Color / Pigmentation] : normal skin color and pigmentation [No Focal Deficits] : no focal deficits [Oriented To Time, Place, And Person] : oriented to person, place, and time

## 2022-12-14 NOTE — HISTORY OF PRESENT ILLNESS
[FreeTextEntry1] : Dec 14, 2022 \par \par KIERAN ROY MD\par \par Pleasant 62-year-old female\par \par Generally in good health\par \par Now has polymyalgia rheumatica and is on tapering dose of steroid, currently on 7.5 mg a day and doing fairly well\par \par Ms. SERJIO FLOOD 62 year is referred for colon cancer screening.  The patient denies any change in bowel movements, blood per rectum, abdominal, pelvic or rectal discomfort.   \par \par There is no family history of colon cancer or other gastrointestinal cancers.\par \par The patient denies any unexplained weight loss, fever chills or night sweats.\par \par This is the 2nd screening colonoscopy for the patient.\par \par There is no significant cardiac or pulmonary history.\par \par No complaints of chest pain, shortness of breath, palpitations, cough.\par \par The patient is feeling quite well.\par \par The patient is on no significant anticoagulant therapy or anti platelet therapy. \par \par No adverse reaction to anesthesia in the past.\par \par

## 2022-12-15 LAB
ANION GAP SERPL CALC-SCNC: 13 MMOL/L
BUN SERPL-MCNC: 14 MG/DL
CALCIUM SERPL-MCNC: 9.7 MG/DL
CHLORIDE SERPL-SCNC: 102 MMOL/L
CHOLEST SERPL-MCNC: 220 MG/DL
CO2 SERPL-SCNC: 25 MMOL/L
CREAT SERPL-MCNC: 0.71 MG/DL
EGFR: 97 ML/MIN/1.73M2
ESTIMATED AVERAGE GLUCOSE: 111 MG/DL
GLUCOSE SERPL-MCNC: 104 MG/DL
HBA1C MFR BLD HPLC: 5.5 %
HDLC SERPL-MCNC: 97 MG/DL
LDLC SERPL CALC-MCNC: 103 MG/DL
LDLC SERPL DIRECT ASSAY-MCNC: 103 MG/DL
NONHDLC SERPL-MCNC: 124 MG/DL
POTASSIUM SERPL-SCNC: 4.3 MMOL/L
SODIUM SERPL-SCNC: 141 MMOL/L
T4 FREE SERPL-MCNC: 1.2 NG/DL
TRIGL SERPL-MCNC: 102 MG/DL

## 2023-02-22 ENCOUNTER — APPOINTMENT (OUTPATIENT)
Dept: CARDIOLOGY | Facility: CLINIC | Age: 63
End: 2023-02-22
Payer: COMMERCIAL

## 2023-02-22 PROCEDURE — 93306 TTE W/DOPPLER COMPLETE: CPT

## 2023-02-27 ENCOUNTER — TRANSCRIPTION ENCOUNTER (OUTPATIENT)
Age: 63
End: 2023-02-27

## 2023-04-18 ENCOUNTER — APPOINTMENT (OUTPATIENT)
Dept: INTERNAL MEDICINE | Facility: CLINIC | Age: 63
End: 2023-04-18
Payer: COMMERCIAL

## 2023-04-18 VITALS
BODY MASS INDEX: 23.43 KG/M2 | SYSTOLIC BLOOD PRESSURE: 116 MMHG | HEART RATE: 58 BPM | DIASTOLIC BLOOD PRESSURE: 72 MMHG | OXYGEN SATURATION: 98 % | WEIGHT: 116 LBS

## 2023-04-18 DIAGNOSIS — Z12.11 ENCOUNTER FOR SCREENING FOR MALIGNANT NEOPLASM OF COLON: ICD-10-CM

## 2023-04-18 DIAGNOSIS — M71.20 SYNOVIAL CYST OF POPLITEAL SPACE [BAKER], UNSPECIFIED KNEE: ICD-10-CM

## 2023-04-18 DIAGNOSIS — M71.21 SYNOVIAL CYST OF POPLITEAL SPACE [BAKER], RIGHT KNEE: ICD-10-CM

## 2023-04-18 PROCEDURE — 99214 OFFICE O/P EST MOD 30 MIN: CPT

## 2023-04-18 NOTE — HISTORY OF PRESENT ILLNESS
[FreeTextEntry1] : PVD [de-identified] : reviewed 12/14/23 nl A1c , \par - pt states she was dx w polymyalgia rheum- pt was seen by Rheum since 6/2022- Dr. Kay Chandra- on 4 mg pred\par pt trying to eat healthy, doing yoga.  reviewed 3/7/23 CXR - nl .  reviewed 3/7/23 xray SI jt- unremarkable=- seen on pt's note\par - ref to PT . still has shoulder\par  Wakes in the AM and b/l  legs are painful, heaviness until she takes prednisone.  had neg US venousas per pt\par pt leg swelling  intermittent   - R bakers cyst- pt c/o tightness when bending her knee- reviewed 11/28/22  Ortho notes\par reviewed 2/7/23 US art- mild atherosclerosis- rec statin\par reviewed 2/22/23 ECHO- mild TR.  reviewed 3/8/23 labs\par colon CA screening- reviewed 12/14/22 GI notes- rec colonoscopy

## 2023-04-18 NOTE — PLAN
[FreeTextEntry1] : PVD- start rosuvastatin- SE discussed\par f/u w GYN\par PVD- pt wants to see Vascular\par rec colonoscopy\par polymyalgia Rheum- Stable. Continue establish treatment plan.\par

## 2023-06-07 ENCOUNTER — NON-APPOINTMENT (OUTPATIENT)
Age: 63
End: 2023-06-07

## 2023-06-09 ENCOUNTER — APPOINTMENT (OUTPATIENT)
Dept: GASTROENTEROLOGY | Facility: AMBULATORY MEDICAL SERVICES | Age: 63
End: 2023-06-09
Payer: COMMERCIAL

## 2023-06-09 PROCEDURE — 45378 DIAGNOSTIC COLONOSCOPY: CPT | Mod: 33

## 2023-07-10 ENCOUNTER — APPOINTMENT (OUTPATIENT)
Dept: INTERNAL MEDICINE | Facility: CLINIC | Age: 63
End: 2023-07-10
Payer: COMMERCIAL

## 2023-07-10 VITALS
BODY MASS INDEX: 22.22 KG/M2 | OXYGEN SATURATION: 98 % | DIASTOLIC BLOOD PRESSURE: 71 MMHG | WEIGHT: 110 LBS | SYSTOLIC BLOOD PRESSURE: 116 MMHG | HEART RATE: 62 BPM

## 2023-07-10 DIAGNOSIS — R73.09 OTHER ABNORMAL GLUCOSE: ICD-10-CM

## 2023-07-10 PROCEDURE — 99214 OFFICE O/P EST MOD 30 MIN: CPT

## 2023-07-10 RX ORDER — ROSUVASTATIN CALCIUM 5 MG/1
5 TABLET, FILM COATED ORAL
Qty: 90 | Refills: 0 | Status: DISCONTINUED | COMMUNITY
Start: 2023-04-18 | End: 2023-07-10

## 2023-07-10 RX ORDER — PREDNISONE 20 MG/1
20 TABLET ORAL
Refills: 0 | Status: DISCONTINUED | COMMUNITY
End: 2023-07-10

## 2023-07-10 RX ORDER — SODIUM SULFATE, POTASSIUM SULFATE AND MAGNESIUM SULFATE 1.6; 3.13; 17.5 G/177ML; G/177ML; G/177ML
17.5-3.13-1.6 SOLUTION ORAL
Qty: 1 | Refills: 0 | Status: DISCONTINUED | COMMUNITY
Start: 2022-12-14 | End: 2023-07-10

## 2023-07-10 RX ORDER — PREDNISONE 5 MG/1
5 TABLET ORAL
Qty: 30 | Refills: 0 | Status: DISCONTINUED | COMMUNITY
Start: 2022-10-18 | End: 2023-07-10

## 2023-07-10 NOTE — PLAN
[FreeTextEntry1] : pt has blood work to be done by Rheum\par PVD- rec CT calcium score and statin  but pt wants to hold off.  \par pt was told to hold off vaccine until  she is off pred\par wgt loss- since chg diet- reviewed healthy diet w pt- spend 20 min reviewing diet. \par   35 minutes was spent during this encounter preparing for this encounter, evaluating the patient and documenting the above EM service.  This includes counseling, and educating the patient on the disease course and it's treatment/ management. \par \par

## 2023-07-10 NOTE — HISTORY OF PRESENT ILLNESS
[FreeTextEntry1] : PVD [de-identified] : reviewed 4/11/23 labs\par - pt states she was dx w polymyalgia rheum- pt was seen by Rheum since 6/2022- Dr. Kay Chandra- on 4 mg pred\par pt trying to eat healthy, doing yoga.  reviewed 3/7/23 CXR - nl .  reviewed 3/7/23 xray SI jt- unremarkable=- seen on pt's note\par - ref to PT . still has shoulder pain\par  Wakes in the AM and b/l  legs are painful, heaviness - now tapered to pred 1.5 mg- states arm pain is better since tapering down pred.  leg pains better when she exercises or later in the day.  \par knee, leg swelling  intermittent   - R bakers cyst-resolved\par  no abd bloating, diarrhea\par reviewed 2/7/23 US art- mild atherosclerosis- rec statin but pt not taking statin b/c worried about myalgia\par exercises- bike 30 min-low speed- no CP or SOB.  \par reviewed 2/22/23 ECHO- mild TR. \par wgt loss- chg her diet- following a diet-no processed foods, red meat\par colon CA screening- reviewed 6/9/23 diverticuli, hemorrhoid. - rpt 10 yr.  lost wgt\par states after supraprep- dev b/l shoulder pain

## 2023-08-21 ENCOUNTER — APPOINTMENT (OUTPATIENT)
Dept: ORTHOPEDIC SURGERY | Facility: CLINIC | Age: 63
End: 2023-08-21
Payer: COMMERCIAL

## 2023-08-21 VITALS
DIASTOLIC BLOOD PRESSURE: 79 MMHG | HEIGHT: 59 IN | HEART RATE: 63 BPM | SYSTOLIC BLOOD PRESSURE: 115 MMHG | BODY MASS INDEX: 21.37 KG/M2 | WEIGHT: 106 LBS

## 2023-08-21 PROCEDURE — 99214 OFFICE O/P EST MOD 30 MIN: CPT

## 2023-08-22 NOTE — PHYSICAL EXAM
[de-identified] : Right shoulder exam:   Inspection: No malalignment, No defects, No atrophy Skin: No masses, No lesions Neck: Negative Spurling, full ROM, no pain with ROM AROM: FF to 60, abduction to 80, ER to 60, IR to upper lumbar Painful arc ROM: none Tenderness: No bicipital tenderness, no tenderness to greater tuberosity/RTC insertion, no anterior shoulder/lesser tuberosity tenderness Strength: 5/5 ER, 4/5 IR in adduction, 5/5 supraspinatus testing, negative Jackson's test AC joint: No TTP/pain with cross arm testing Biceps: Speed Negative, Yergason Negative Impingement test: + Su, +Neer Vasc: 2+ radial pulse Stability: Stable Neuro: AIN, PIN, Ulnar nerve intact to motor Sensation: Intact to light touch throughout Other findings: None.   Left  shoulder exam  Inspection: No malalignment, No defects, No atrophy Skin: No masses, No lesions Neck: Negative Spurling's, full ROM, no pain with ROM AROM: FF to 80, abduction to 90, ER to 80, IR to lower lumbar Painful arc ROM: Pain with range of motion Tenderness: no bicipital tenderness, +tenderness to the greater tuberosity/RTC insertion, no anterior shoulder/lesser tuberosity tenderness Strength: 5/5 ER, 5/5 IR in adduction, 5/5 supraspinatus testing, +Jackson's test AC Joint: No ttp/pain with cross arm testing Biceps: Speed Negative, Yergusons Negative Impingement test: +Su, +Neer  Stability: Stable Vasc: 2+ radial pulse Neuro: AIN, PIN, Ulnar nerve intact to motor Sensation: Intact to light touch throughout [de-identified] : Images were reviewed dated 08/09/2023    3 views of right shoulder that show no acute fracture or dislocation. There is no glenohumeral and mild AC joint degenerative change seen. Type I acromion. There is no significant malalignment. No significant other obvious osseous abnormality, otherwise unremarkable.   3 views of left shoulder that show no acute fracture or dislocation. There is no glenohumeral and mild AC joint degenerative change seen. Type I acromion. There is no significant malalignment.  Improved calcific deposit within the RTC.  15 feet/bed to chair

## 2023-08-22 NOTE — ADDENDUM
[FreeTextEntry1] : This note was written by Samaria Márquez on 08/21/2023 acting solely as a scribe for Dr. Nadeem Snow.  All medical record entries made by the Scribe were at my, Dr. Nadeem Snow, direction and personally dictated by me on 08/21/2023. I have personally reviewed the chart and agree that the record accurately reflects my personal performance of the history, physical exam, assessment and plan.

## 2023-08-22 NOTE — HISTORY OF PRESENT ILLNESS
[de-identified] : 63-year-old RHD female presents with bilateral shoulder pain; R>L x 6 months. No injury reported. The pain is brought on with reaching forward, overhead and behind her. Taking Tylenol for relief.  She has a history of PMR (Dr. Kay Chandra), and has been on prednisone, just recently stopped last week. She feels her muscles have atrophied from the prednisone and this may have worsened her pain. Seen in May 2022 for left shoulder calcific tendinitis.  Reports multiple musculoskeletal complaints with myalgia as well as bilateral lower extremity pain including right knee pain and Baker's cyst.

## 2023-08-22 NOTE — DISCUSSION/SUMMARY
[de-identified] : 63-year-old female with bilateral shoulder pain.   Patient has poor effort with examination today of the shoulders bilaterally, and reports no significant trauma.  Patient has been on prednisone for PMR, and that does not appear to be any significant bony changes consistent with avascular necrosis.  Patient has had chronic shoulder pains, as well as other musculoskeletal complaints that do not appear to be orthopedic in nature.  We discussed inflammatory discomfort that may be best treated with rheumatologic care.  However we discussed short-term and long-term outcomes as well as the goal of treatment to reduce pain and restore function. Nonsurgical treatment is typically first-line therapy that may take weeks to months to resolve symptoms; includes rest from overhead activities, NSAIDs, home exercise program versus physical therapy to restore normal strength/ROM/function of the shoulder, and possible corticosteroid injection.   Recommendations: Begin trial of PT, Rx given. Conservative modalities as above (overhead activity rest/activity avoidance until less symptomatic, ice, NSAIDs, home exercise strengthening and stretching program).   Followup Rheum as needed.

## 2023-08-23 DIAGNOSIS — Z13.6 ENCOUNTER FOR SCREENING FOR CARDIOVASCULAR DISORDERS: ICD-10-CM

## 2023-09-15 ENCOUNTER — APPOINTMENT (OUTPATIENT)
Dept: VASCULAR SURGERY | Facility: CLINIC | Age: 63
End: 2023-09-15
Payer: COMMERCIAL

## 2023-09-15 VITALS — HEIGHT: 59 IN | HEART RATE: 88 BPM | OXYGEN SATURATION: 99 % | WEIGHT: 106 LBS | BODY MASS INDEX: 21.37 KG/M2

## 2023-09-15 VITALS — DIASTOLIC BLOOD PRESSURE: 66 MMHG | SYSTOLIC BLOOD PRESSURE: 130 MMHG

## 2023-09-15 PROCEDURE — 99203 OFFICE O/P NEW LOW 30 MIN: CPT

## 2023-09-15 RX ORDER — PREDNISONE 1 MG/1
1 TABLET ORAL DAILY
Refills: 0 | Status: DISCONTINUED | COMMUNITY
End: 2023-09-15

## 2023-09-20 ENCOUNTER — NON-APPOINTMENT (OUTPATIENT)
Age: 63
End: 2023-09-20

## 2023-09-27 ENCOUNTER — APPOINTMENT (OUTPATIENT)
Dept: VASCULAR SURGERY | Facility: CLINIC | Age: 63
End: 2023-09-27

## 2023-10-25 ENCOUNTER — APPOINTMENT (OUTPATIENT)
Dept: INTERNAL MEDICINE | Facility: CLINIC | Age: 63
End: 2023-10-25
Payer: COMMERCIAL

## 2023-10-25 VITALS
HEART RATE: 69 BPM | DIASTOLIC BLOOD PRESSURE: 70 MMHG | BODY MASS INDEX: 21.41 KG/M2 | WEIGHT: 106 LBS | OXYGEN SATURATION: 96 % | SYSTOLIC BLOOD PRESSURE: 118 MMHG

## 2023-10-25 DIAGNOSIS — M79.605 PAIN IN LEFT LEG: ICD-10-CM

## 2023-10-25 DIAGNOSIS — R21 RASH AND OTHER NONSPECIFIC SKIN ERUPTION: ICD-10-CM

## 2023-10-25 DIAGNOSIS — Z12.83 ENCOUNTER FOR SCREENING FOR MALIGNANT NEOPLASM OF SKIN: ICD-10-CM

## 2023-10-25 PROCEDURE — 99214 OFFICE O/P EST MOD 30 MIN: CPT

## 2023-10-25 RX ORDER — BACLOFEN 10 MG/1
10 TABLET ORAL 3 TIMES DAILY
Qty: 30 | Refills: 0 | Status: DISCONTINUED | COMMUNITY
Start: 2023-09-15 | End: 2023-10-25

## 2023-10-25 RX ORDER — MELOXICAM 7.5 MG/1
7.5 TABLET ORAL
Refills: 0 | Status: ACTIVE | COMMUNITY

## 2023-10-25 RX ORDER — CLOBETASOL PROPIONATE 0.5 MG/ML
0.05 SOLUTION TOPICAL DAILY
Qty: 1 | Refills: 0 | Status: ACTIVE | COMMUNITY
Start: 2023-10-25 | End: 1900-01-01

## 2023-10-25 RX ORDER — DICLOFENAC POTASSIUM 50 MG/1
50 TABLET, COATED ORAL 3 TIMES DAILY
Qty: 60 | Refills: 3 | Status: DISCONTINUED | COMMUNITY
Start: 2023-09-15 | End: 2023-10-25

## 2023-12-22 DIAGNOSIS — M75.50 BURSITIS OF UNSPECIFIED SHOULDER: ICD-10-CM

## 2024-01-19 ENCOUNTER — LABORATORY RESULT (OUTPATIENT)
Age: 64
End: 2024-01-19

## 2024-01-19 ENCOUNTER — APPOINTMENT (OUTPATIENT)
Dept: RHEUMATOLOGY | Facility: CLINIC | Age: 64
End: 2024-01-19
Payer: COMMERCIAL

## 2024-01-19 VITALS
OXYGEN SATURATION: 98 % | WEIGHT: 108 LBS | BODY MASS INDEX: 21.77 KG/M2 | SYSTOLIC BLOOD PRESSURE: 127 MMHG | DIASTOLIC BLOOD PRESSURE: 82 MMHG | HEIGHT: 59 IN | HEART RATE: 65 BPM

## 2024-01-19 DIAGNOSIS — M25.511 PAIN IN RIGHT SHOULDER: ICD-10-CM

## 2024-01-19 DIAGNOSIS — M79.10 MYALGIA, UNSPECIFIED SITE: ICD-10-CM

## 2024-01-19 DIAGNOSIS — M25.551 PAIN IN RIGHT HIP: ICD-10-CM

## 2024-01-19 DIAGNOSIS — M25.512 PAIN IN RIGHT SHOULDER: ICD-10-CM

## 2024-01-19 DIAGNOSIS — M25.552 PAIN IN RIGHT HIP: ICD-10-CM

## 2024-01-19 DIAGNOSIS — M75.51 BURSITIS OF RIGHT SHOULDER: ICD-10-CM

## 2024-01-19 PROCEDURE — 99205 OFFICE O/P NEW HI 60 MIN: CPT

## 2024-01-19 RX ORDER — DICLOFENAC SODIUM 1% 10 MG/G
1 GEL TOPICAL
Qty: 300 | Refills: 5 | Status: ACTIVE | COMMUNITY
Start: 2024-01-19

## 2024-01-19 NOTE — DATA REVIEWED
[FreeTextEntry1] : Laboratory and radiology studies that were personally reviewed at today's visit are summarized in above and below:  DEXA (9-2022): OSTEOPOROSIS:  Spine: -2.4,, Femoral neck: -2.9 left,  Total hip: -1.0 left,   [records from Dr. Chandra reviewed -  medication  mobic 7.5 mg - 2 tabs in the am  likey PMR in the past but not recently with active disease.  working with orthopedics for shoulder disease  + HLAB27 g6pd = 18 8-2-23 - esr = 50,  + WENDY with negative subsets knee xray - OA hand xray - OA Right shoulder MRI - moderate rtc tendinosis with mild to moderate subacromiasubdeltoid bursitis.  tendinosis and partial thickness interstitial tearing of the intra-articular segment biceps tendon.  mild biceps tenosynovitis within the bicipital groove.  synovitis in the rotator cuff interval.  mild to moderate acromo-clavicular arthropathy.  10- labs - esr = 29, normal Cr, CK, aldolase  tried diclofenac but it bother her stomach  on mobic since 2023]r

## 2024-01-19 NOTE — HISTORY OF PRESENT ILLNESS
[Glucocorticoid] : glucocorticoid use [Family history of OP] : family history of osteoporosis [FreeTextEntry1] : SERJIO FLOOD is a 63 year  old female, seen on today  for  Diagnosed with PMD in 5-2022 and started on prednisone and has been weaned off prednisone.  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  symptoms of PMR resolved by 1-2023 and stopped steroids in 8-2023  Currently on meloxicam 7.5 mg every other day for shoulder pain and couldn't tolerate higher dose of meloxicam.  Has HLAB27+: saw derm and was told she has psoriasis on the back of the neck (only site) , no d/c/n/v and no blood in the stool  + WENDY with negative serologies in the past - mild dry eyes, no dry dry mouth, hair is mildly thinner, no sun sensitivity, no chest pain or shortness of breath, 2 MC (had done IVF - one at 6 weeks and one at less than 12 weeks), no DVT or PE,  Joint pain - initlaly in 5-2022 with right leg pain that progressed over time to pain in the proximal arms and proximal legs.  she was treated with prednisone and did well with prednisone but developed steroid myopathy.  currently right shoudler with decreased range of motion and right leg is less painful.  has known structural disease on MRI as below.   Has Osteoporosis on bone density and has not tried any medications yet.  hasn't been taking calcium  felt that PT was helpful for her right shoulder and leg and would like to restart PT  main symptom now is hand pain and stiffness and difficulty opening water bottles hasn't tried volraren gel     Previously treated at [facility] (Kay Chandra MD) and changing to this office  [records from Dr. Chandra reviewed -  medication  mobic 7.5 mg - 2 tabs in the am  likey PMR in the past but not recently with active disease.  working with orthopedics for shoulder disease  + HLAB27 g6pd = 18 8-2-23 - esr = 50,  + WENDY with negative subsets knee xray - OA hand xray - OA Right shoulder MRI - moderate rtc tendinosis with mild to moderate subacromiasubdeltoid bursitis.  tendinosis and partial thickness interstitial tearing of the intra-articular segment biceps tendon.  mild biceps tenosynovitis within the bicipital groove.  synovitis in the rotator cuff interval.  mild to moderate acromo-clavicular arthropathy.  10- labs - esr = 29, normal Cr, CK, aldolase  tried diclofenac but it bother her stomach  on mobic since 2023] [Low Ca/Vit D intake] : normal calcium and vitamin D intake [Inflammatory arthritis] : no inflammatory arthritis [Parental hx of hip fx] : no parental history of hip fracture [Low body weight] : no history of low body weight [Radiation therapy hx] : no radiation therapy history [Previous fragility Fx] : no previous fragility fracture [High Falls Risk] : not a high falls risk [Frequent falls] : no history of frequent falls [Current Smoker] : not a current smoker [Excessive alcohol consumption] : no history of excessive alcohol consumption [Loss of height] : no loss of height [Spine pain] : no spine pain [New fracture] : no new fractures [TextBox_37] : DEXA 9-2022:  Spine: -2.4, Femoral neck: -2.9 left, Total hip: -1.0 left,

## 2024-01-19 NOTE — PHYSICAL EXAM
[General Appearance - Alert] : alert [General Appearance - In No Acute Distress] : in no acute distress [General Appearance - Well Nourished] : well nourished [General Appearance - Well Developed] : well developed [Sclera] : the sclera and conjunctiva were normal [Neck Appearance] : the appearance of the neck was normal [FreeTextEntry1] : no s/t joints, dec rom on flex of right shoulder, negative mily/fadir  [No Focal Deficits] : no focal deficits

## 2024-01-19 NOTE — ASSESSMENT
[FreeTextEntry1] : SERJIO FLOOD is a 63 year  old female, seen on today  for Joint pain  shoudlers, hands clincally consistent with OA but concern for IA given history and HLAB27+ and psoriasis  check US hands to look for inflammatory disease trial of voltaren gel and minimize oral mobic use  Will check laboratory tests to look for markers of disease activity and also to assess for medication toxicity.  look for stigmata of autoimmune disease on blood work   history of myopathy (Steroid myopathy)  check CK has never been on statin but this is not a contraindication to statin use if needed   PMR (2532-2687) ->  not currently active - check esr/crp   Osteoporosis  ->  risk factors - family history, etoh use, steroid use (0505-7725) ->  calcium and vitamin d d/w patient  ->  prolia and bisphos d/w patient and she would like another dexa first.  given that last dexa in 9-2022 and she has not been on therapy and was on steroids it is reasonable to get another dexa for baseline prior to starting therapy.   More than 50% of the encounter was spent counseling SERJIO FLOOD on the differential, workup, disease course, and treatment/management.   Education was provided to SERJIO FLOOD during this encounter. All questions and concerns were answered and addressed in detail.  SERJIO FLOOD verbalized understanding and agreed to the plan.   SERJIO FLOOD has been instructed to call for an earlier appointment if new symptoms develop in the interim. SERJIO FLOOD has been instructed to make a follow-up appointment in 3 months

## 2024-01-22 ENCOUNTER — NON-APPOINTMENT (OUTPATIENT)
Age: 64
End: 2024-01-22

## 2024-01-29 LAB
14-3-3 ETA AG SER IA-MCNC: <0.2 NG/ML
25(OH)D3 SERPL-MCNC: 28.8 NG/ML
ALBUMIN SERPL ELPH-MCNC: 4.6 G/DL
ALP BLD-CCNC: 95 U/L
ALT SERPL-CCNC: 12 U/L
ANA PAT FLD IF-IMP: NORMAL
ANA SER IF-ACNC: ABNORMAL
ANION GAP SERPL CALC-SCNC: 13 MMOL/L
APPEARANCE: CLEAR
AST SERPL-CCNC: 18 U/L
BACTERIA: NEGATIVE /HPF
BASOPHILS # BLD AUTO: 0.03 K/UL
BASOPHILS NFR BLD AUTO: 0.4 %
BILIRUB SERPL-MCNC: 0.6 MG/DL
BILIRUBIN URINE: NEGATIVE
BLOOD URINE: NEGATIVE
BUN SERPL-MCNC: 15 MG/DL
C3 SERPL-MCNC: 119 MG/DL
C4 SERPL-MCNC: 22 MG/DL
CALCIUM SERPL-MCNC: 9.4 MG/DL
CAST: 0 /LPF
CCP ANTIBODIES IGG/IGA: <20 UNITS
CENTROMERE IGG SER-ACNC: <0.2 AL
CHLORIDE SERPL-SCNC: 101 MMOL/L
CK SERPL-CCNC: 32 U/L
CO2 SERPL-SCNC: 23 MMOL/L
COLOR: YELLOW
CREAT SERPL-MCNC: 0.65 MG/DL
CREAT SPEC-SCNC: 48 MG/DL
CREAT/PROT UR: 0.1 RATIO
CRP SERPL-MCNC: 12 MG/L
DSDNA AB SER-ACNC: <12 IU/ML
EGFR: 99 ML/MIN/1.73M2
ENA RNP AB SER IA-ACNC: 0.7 AL
ENA SM AB SER IA-ACNC: <0.2 AL
ENA SS-A AB SER IA-ACNC: <0.2 AL
ENA SS-B AB SER IA-ACNC: <0.2 AL
EOSINOPHIL # BLD AUTO: 0.17 K/UL
EOSINOPHIL NFR BLD AUTO: 2.4 %
EPITHELIAL CELLS: 0 /HPF
GLUCOSE QUALITATIVE U: NEGATIVE MG/DL
GLUCOSE SERPL-MCNC: 87 MG/DL
HCT VFR BLD CALC: 38.4 %
HGB BLD-MCNC: 12.4 G/DL
IMM GRANULOCYTES NFR BLD AUTO: 0.1 %
KETONES URINE: NEGATIVE MG/DL
LEUKOCYTE ESTERASE URINE: NEGATIVE
LYMPHOCYTES # BLD AUTO: 1.43 K/UL
LYMPHOCYTES NFR BLD AUTO: 20.6 %
MAN DIFF?: NORMAL
MCHC RBC-ENTMCNC: 31.2 PG
MCHC RBC-ENTMCNC: 32.3 GM/DL
MCV RBC AUTO: 96.7 FL
MICROSCOPIC-UA: NORMAL
MONOCYTES # BLD AUTO: 0.62 K/UL
MONOCYTES NFR BLD AUTO: 8.9 %
NEUTROPHILS # BLD AUTO: 4.69 K/UL
NEUTROPHILS NFR BLD AUTO: 67.6 %
NITRITE URINE: NEGATIVE
PH URINE: 6
PLATELET # BLD AUTO: 303 K/UL
POTASSIUM SERPL-SCNC: 4.2 MMOL/L
PROT SERPL-MCNC: 7.2 G/DL
PROT UR-MCNC: 5 MG/DL
PROTEIN URINE: NEGATIVE MG/DL
RBC # BLD: 3.97 M/UL
RBC # FLD: 13.2 %
RED BLOOD CELLS URINE: 0 /HPF
RHEUMATOID FACTOR IDENTRA: <14 UNITS/ML
SODIUM SERPL-SCNC: 137 MMOL/L
SPECIFIC GRAVITY URINE: 1.01
UROBILINOGEN URINE: 0.2 MG/DL
WBC # FLD AUTO: 6.95 K/UL
WHITE BLOOD CELLS URINE: 1 /HPF

## 2024-01-30 ENCOUNTER — NON-APPOINTMENT (OUTPATIENT)
Age: 64
End: 2024-01-30

## 2024-01-31 ENCOUNTER — APPOINTMENT (OUTPATIENT)
Dept: INTERNAL MEDICINE | Facility: CLINIC | Age: 64
End: 2024-01-31
Payer: COMMERCIAL

## 2024-01-31 ENCOUNTER — NON-APPOINTMENT (OUTPATIENT)
Age: 64
End: 2024-01-31

## 2024-01-31 VITALS
WEIGHT: 108 LBS | HEART RATE: 61 BPM | BODY MASS INDEX: 21.77 KG/M2 | DIASTOLIC BLOOD PRESSURE: 76 MMHG | OXYGEN SATURATION: 99 % | HEIGHT: 59 IN | SYSTOLIC BLOOD PRESSURE: 123 MMHG | TEMPERATURE: 97 F

## 2024-01-31 DIAGNOSIS — Z12.31 ENCOUNTER FOR SCREENING MAMMOGRAM FOR MALIGNANT NEOPLASM OF BREAST: ICD-10-CM

## 2024-01-31 DIAGNOSIS — I73.9 PERIPHERAL VASCULAR DISEASE, UNSPECIFIED: ICD-10-CM

## 2024-01-31 DIAGNOSIS — I49.1 ATRIAL PREMATURE DEPOLARIZATION: ICD-10-CM

## 2024-01-31 DIAGNOSIS — R92.30 DENSE BREASTS, UNSPECIFIED: ICD-10-CM

## 2024-01-31 PROCEDURE — 99396 PREV VISIT EST AGE 40-64: CPT

## 2024-01-31 PROCEDURE — 99213 OFFICE O/P EST LOW 20 MIN: CPT | Mod: 25

## 2024-01-31 PROCEDURE — 93000 ELECTROCARDIOGRAM COMPLETE: CPT

## 2024-01-31 NOTE — HEALTH RISK ASSESSMENT
[Yes] : Yes [4 or more  times a week (4 pts)] : 4 or more  times a week (4 points) [1 or 2 (0 pts)] : 1 or 2 (0 points) [Never (0 pts)] : Never (0 points) [No] : In the past 12 months have you used drugs other than those required for medical reasons? No [0] : 2) Feeling down, depressed, or hopeless: Not at all (0) [PHQ-2 Negative - No further assessment needed] : PHQ-2 Negative - No further assessment needed [Patient reported mammogram was normal] : Patient reported mammogram was normal [Patient reported PAP Smear was normal] : Patient reported PAP Smear was normal [Patient reported bone density results were abnormal] : Patient reported bone density results were abnormal [HIV test declined] : HIV test declined [Hepatitis C test declined] : Hepatitis C test declined [With Patient/Caregiver] : , with patient/caregiver [Designated Healthcare Proxy] : Designated healthcare proxy [Relationship: ___] : Relationship: [unfilled] [Audit-CScore] : 4 [[id number]] : 0 [MammogramDate] : 10/22 [MammogramComments] : as per pt.  rec US [PapSmearDate] : 12/22 [PapSmearComments] : as per pt. pt follows up yearly [BoneDensityDate] : 09/22 [BoneDensityComments] : osteoporosis [ColonoscopyDate] : 06/23 [ColonoscopyComments] :  hemorrhoid, diverticulosis- rpt 10 yr  [de-identified] : see opth  3 wk   ago.  seen Dr. Dinero 1 yr ago [de-identified] : sees dentist regular- 2 wk [AdvancecareDate] : 01/24 [Former] : Former [> 15 Years] : > 15 Years

## 2024-01-31 NOTE — PHYSICAL EXAM
[No Acute Distress] : no acute distress [Well Nourished] : well nourished [Well Developed] : well developed [Well-Appearing] : well-appearing [Normal Sclera/Conjunctiva] : normal sclera/conjunctiva [PERRL] : pupils equal round and reactive to light [Normal Outer Ear/Nose] : the outer ears and nose were normal in appearance [Normal Oropharynx] : the oropharynx was normal [Normal TMs] : both tympanic membranes were normal [No Lymphadenopathy] : no lymphadenopathy [Supple] : supple [Thyroid Normal, No Nodules] : the thyroid was normal and there were no nodules present [No Respiratory Distress] : no respiratory distress  [No Accessory Muscle Use] : no accessory muscle use [Clear to Auscultation] : lungs were clear to auscultation bilaterally [Normal Rate] : normal rate  [Regular Rhythm] : with a regular rhythm [Normal S1, S2] : normal S1 and S2 [No Murmur] : no murmur heard [No Carotid Bruits] : no carotid bruits [No Edema] : there was no peripheral edema [Normal Appearance] : normal in appearance [No Axillary Lymphadenopathy] : no axillary lymphadenopathy [Soft] : abdomen soft [Non Tender] : non-tender [Non-distended] : non-distended [No Masses] : no abdominal mass palpated [Normal Bowel Sounds] : normal bowel sounds [Normal Supraclavicular Nodes] : no supraclavicular lymphadenopathy [Normal Axillary Nodes] : no axillary lymphadenopathy [Normal Posterior Cervical Nodes] : no posterior cervical lymphadenopathy [Normal Anterior Cervical Nodes] : no anterior cervical lymphadenopathy [Grossly Normal Strength/Tone] : grossly normal strength/tone [No Focal Deficits] : no focal deficits [Normal Gait] : normal gait [Normal Affect] : the affect was normal [Normal Insight/Judgement] : insight and judgment were intact

## 2024-01-31 NOTE — HISTORY OF PRESENT ILLNESS
[FreeTextEntry1] : CPE [de-identified] : vaccine- rec tdap , shingrix, , flu , COVID.  diet-healthy diet.  exercise- walking reviewed 1/19/24 labs vit D 28.8, elev CRP- pt non compliant w vit D suppl. pt being tx for dental infection- dental abscess- was tx by Dental- Sept 2023  15  yr - rash in scalp- uses clobetasol- was seen Derm- told it was psoriasis seen Dr. Syed for skin CA screening 12/2023 - pt states she was dx w polymyalgia rheum- pt was seen by Rheum since 6/2022- Dr. Kay Chnadra- on 4 mg pred pt trying to eat healthy, doing yoga.  .  reviewed 3/7/23 xray SI jt- unremarkable=- seen on pt's phone - ref to PT . still has shoulder pain- using meloxicam-50-70 % better.   Wakes in the AM and b/l  legs are painful, heaviness - now off pred - states leg, 80 % better    now pain is different- thinks it is now due to steroids. leg pain and shoulder pain- improving leg pain- reviewed 9/15/23 Vascular notes- prob sciatica- only when she drives for prolonged periods. reviewed 2/7/23 US art- mild atherosclerosis- rec statin but pt not taking statin b/c worried about myalgia reviewed 2/22/23 ECHO- mild TR.  wgt loss-stable since 4/2023.   prev wgt loss- pt thinks due dietary chg and PMR  b/l shoulder pain- reviewed 8/21/23 Ortho notes- prob due to polymyalgia Rheum.  - reviewed 1/19/24 Rheum notes Osteopenia- ordered DEXA hip pain- ref to PT.  shoulder pain - reviewed 11/1/23 PT notes reviewed 9/11/23 MRI shoulder- mod tendinosis, subacrom. / subdeltoid bursitis.  reviewed 8/21/23 xray - mild AC osteoarthritis

## 2024-02-08 ENCOUNTER — APPOINTMENT (OUTPATIENT)
Dept: RHEUMATOLOGY | Facility: CLINIC | Age: 64
End: 2024-02-08

## 2024-04-09 ENCOUNTER — APPOINTMENT (OUTPATIENT)
Dept: RHEUMATOLOGY | Facility: CLINIC | Age: 64
End: 2024-04-09
Payer: COMMERCIAL

## 2024-04-09 VITALS
HEIGHT: 59 IN | SYSTOLIC BLOOD PRESSURE: 127 MMHG | BODY MASS INDEX: 21.77 KG/M2 | HEART RATE: 70 BPM | OXYGEN SATURATION: 98 % | DIASTOLIC BLOOD PRESSURE: 77 MMHG | RESPIRATION RATE: 16 BRPM | WEIGHT: 108 LBS

## 2024-04-09 DIAGNOSIS — M19.90 UNSPECIFIED OSTEOARTHRITIS, UNSPECIFIED SITE: ICD-10-CM

## 2024-04-09 PROCEDURE — 99215 OFFICE O/P EST HI 40 MIN: CPT

## 2024-04-09 NOTE — ASSESSMENT
[FreeTextEntry1] : SERJIO FLOOD is a 63 year  old female, seen on today  for Joint pain  Spondyloarthropathy with peripheral disease  Right shoudler mri with bursitis and synovitis and OA  check US hands to look for inflammatory disease continue voltaren gel and minimize oral mobic use for now  Will check laboratory tests to look for markers of disease activity and also to assess for medication toxicity.  d/w patinet today that concern for spondyloarthropathy with peripheral arthritis  will follow up US scheduleed for today .  Based on results will adjust medications.  uses etoh 2 glasses a night and prefers not to use mtx/arava.    history of myopathy (Steroid myopathy)  check CK has never been on statin but this is not a contraindication to statin use if needed   PMR (4644-3685) ->  not currently active - check esr/crp   Osteoporosis  ->  risk factors - family history, etoh use, steroid use (8960-2440) ->  calcium and vitamin d d/w patient  ->  prolia and bisphos d/w patient and she would like another dexa first.  given that last dexa in 9-2022 and she has not been on therapy and was on steroids it is reasonable to get another Dexa for baseline prior to starting therapy.   More than 50% of the encounter was spent counseling SERJIO FLOOD on the differential, workup, disease course, and treatment/management.   Education was provided to SERJIO FLOOD during this encounter. All questions and concerns were answered and addressed in detail.  SERJIO FLOOD verbalized understanding and agreed to the plan.   SERJIO FLOOD has been instructed to call for an earlier appointment if new symptoms develop in the interim. SERJIO FLOOD has been instructed to make a follow-up appointment in 3 months

## 2024-04-09 NOTE — HISTORY OF PRESENT ILLNESS
[Glucocorticoid] : glucocorticoid use [Family history of OP] : family history of osteoporosis [FreeTextEntry1] : SERJIO FLOOD is a 63 year  old female, seen on today  for  PMR (dx in 5-2022 and off steroids currently) - not active  Spondyloarthopathy (HLAB27+) => psoriasis Currently on meloxicam 7.5 mg every other day for shoulder pain and couldn't tolerate higher dose of meloxicam.  Has HLAB27+: saw derm and was told she has psoriasis on the back of the neck (only site) , no d/c/n/v and no blood in the stool  + joint pain in hip and legs and wrists - limiting activity due to joint pain    + WENDY with negative serologies in the past - mild dry eyes, no dry dry mouth, hair is mildly thinner, no sun sensitivity, no chest pain or shortness of breath, 2 MC (had done IVF - one at 6 weeks and one at less than 12 weeks), no DVT or PE,   Has Osteoporosis on bone density and has not tried any medications yet.  hasn't been taking calcium  => offered medications and deferred    Previously treated at [facility] (Kay Chandra MD) and changing to this office  [records from Dr. Chandra reviewed -  medication  mobic 7.5 mg - 2 tabs in the am  likey PMR in the past but not recently with active disease.  working with orthopedics for shoulder disease  + HLAB27 g6pd = 18 8-2-23 - esr = 50,  + WENDY with negative subsets knee xray - OA hand xray - OA Right shoulder MRI - moderate rtc tendinosis with mild to moderate subacromiasubdeltoid bursitis.  tendinosis and partial thickness interstitial tearing of the intra-articular segment biceps tendon.  mild biceps tenosynovitis within the bicipital groove.  synovitis in the rotator cuff interval.  mild to moderate acromo-clavicular arthropathy.  10- labs - esr = 29, normal Cr, CK, aldolase  tried diclofenac but it bother her stomach  on mobic since 2023] [Low Ca/Vit D intake] : normal calcium and vitamin D intake [Inflammatory arthritis] : no inflammatory arthritis [Parental hx of hip fx] : no parental history of hip fracture [Low body weight] : no history of low body weight [Radiation therapy hx] : no radiation therapy history [Previous fragility Fx] : no previous fragility fracture [High Falls Risk] : not a high falls risk [Frequent falls] : no history of frequent falls [Current Smoker] : not a current smoker [Excessive alcohol consumption] : no history of excessive alcohol consumption [Loss of height] : no loss of height [Spine pain] : no spine pain [New fracture] : no new fractures [HLA-B27] : HLA-B27 [AM Back stiffness] : no AM back stiffness [Back pain that wakes patient from sleep] : no back pain that wakes patient from sleep [Neck pain] : neck pain [Joint Pain] : joint pain [Joint stiffness] : joint stiffness [Joint Swelling] : no joint swelling [Urethritis] : no urethritis [Eye Pain] : no eye pain [Eye redness] : no eye redness [Dactylitis] : no dactylitis [Diarrhea] : no diarrhea [Rash] : no rash [TextBox_37] : DEXA 9-2022:  Spine: -2.4, Femoral neck: -2.9 left, Total hip: -1.0 left,

## 2024-04-09 NOTE — PHYSICAL EXAM
[General Appearance - Alert] : alert [General Appearance - In No Acute Distress] : in no acute distress [General Appearance - Well Nourished] : well nourished [General Appearance - Well Developed] : well developed [Sclera] : the sclera and conjunctiva were normal [Neck Appearance] : the appearance of the neck was normal [No Focal Deficits] : no focal deficits [FreeTextEntry1] : no s/t joints  pain with rom of wrists , dec rom on flex of right shoulder, negative mily/fadir

## 2024-04-15 LAB
ALBUMIN SERPL ELPH-MCNC: 4.5 G/DL
ALP BLD-CCNC: 96 U/L
ALT SERPL-CCNC: 11 U/L
ANION GAP SERPL CALC-SCNC: 11 MMOL/L
AST SERPL-CCNC: 16 U/L
BASOPHILS # BLD AUTO: 0.04 K/UL
BASOPHILS NFR BLD AUTO: 0.6 %
BILIRUB SERPL-MCNC: 0.9 MG/DL
BUN SERPL-MCNC: 13 MG/DL
CALCIUM SERPL-MCNC: 9.7 MG/DL
CHLORIDE SERPL-SCNC: 101 MMOL/L
CO2 SERPL-SCNC: 27 MMOL/L
CREAT SERPL-MCNC: 0.72 MG/DL
CRP SERPL-MCNC: 8 MG/L
EGFR: 94 ML/MIN/1.73M2
EOSINOPHIL # BLD AUTO: 0.13 K/UL
EOSINOPHIL NFR BLD AUTO: 1.9 %
ERYTHROCYTE [SEDIMENTATION RATE] IN BLOOD BY WESTERGREN METHOD: 16 MM/HR
GLUCOSE SERPL-MCNC: 97 MG/DL
HAV IGM SER QL: NONREACTIVE
HBV CORE IGG+IGM SER QL: NONREACTIVE
HBV CORE IGM SER QL: NONREACTIVE
HBV SURFACE AG SER QL: NONREACTIVE
HCT VFR BLD CALC: 39.7 %
HCV AB SER QL: NONREACTIVE
HCV S/CO RATIO: 0.07 S/CO
HGB BLD-MCNC: 12.8 G/DL
IMM GRANULOCYTES NFR BLD AUTO: 0.4 %
LYMPHOCYTES # BLD AUTO: 1.35 K/UL
LYMPHOCYTES NFR BLD AUTO: 19.5 %
M TB IFN-G BLD-IMP: NEGATIVE
MAN DIFF?: NORMAL
MCHC RBC-ENTMCNC: 30.8 PG
MCHC RBC-ENTMCNC: 32.2 GM/DL
MCV RBC AUTO: 95.7 FL
MONOCYTES # BLD AUTO: 0.55 K/UL
MONOCYTES NFR BLD AUTO: 7.9 %
NEUTROPHILS # BLD AUTO: 4.83 K/UL
NEUTROPHILS NFR BLD AUTO: 69.7 %
PLATELET # BLD AUTO: 307 K/UL
POTASSIUM SERPL-SCNC: 4.6 MMOL/L
PROT SERPL-MCNC: 7.5 G/DL
QUANTIFERON TB PLUS MITOGEN MINUS NIL: 6.7 IU/ML
QUANTIFERON TB PLUS NIL: 0.03 IU/ML
QUANTIFERON TB PLUS TB1 MINUS NIL: 0 IU/ML
QUANTIFERON TB PLUS TB2 MINUS NIL: 0.01 IU/ML
RBC # BLD: 4.15 M/UL
RBC # FLD: 12.5 %
SODIUM SERPL-SCNC: 139 MMOL/L
WBC # FLD AUTO: 6.93 K/UL

## 2024-05-17 ENCOUNTER — RX RENEWAL (OUTPATIENT)
Age: 64
End: 2024-05-17

## 2024-05-21 ENCOUNTER — APPOINTMENT (OUTPATIENT)
Dept: RHEUMATOLOGY | Facility: CLINIC | Age: 64
End: 2024-05-21
Payer: COMMERCIAL

## 2024-05-21 VITALS
DIASTOLIC BLOOD PRESSURE: 88 MMHG | HEART RATE: 61 BPM | SYSTOLIC BLOOD PRESSURE: 121 MMHG | RESPIRATION RATE: 16 BRPM | HEIGHT: 59 IN | OXYGEN SATURATION: 98 % | BODY MASS INDEX: 21.77 KG/M2 | WEIGHT: 108 LBS

## 2024-05-21 DIAGNOSIS — Z00.00 ENCOUNTER FOR GENERAL ADULT MEDICAL EXAMINATION W/OUT ABNORMAL FINDINGS: ICD-10-CM

## 2024-05-21 DIAGNOSIS — M25.50 PAIN IN UNSPECIFIED JOINT: ICD-10-CM

## 2024-05-21 DIAGNOSIS — L40.9 PSORIASIS, UNSPECIFIED: ICD-10-CM

## 2024-05-21 DIAGNOSIS — M35.3 POLYMYALGIA RHEUMATICA: ICD-10-CM

## 2024-05-21 DIAGNOSIS — M81.0 AGE-RELATED OSTEOPOROSIS W/OUT CURRENT PATHOLOGICAL FRACTURE: ICD-10-CM

## 2024-05-21 PROCEDURE — 99215 OFFICE O/P EST HI 40 MIN: CPT

## 2024-05-21 NOTE — PHYSICAL EXAM
[General Appearance - Alert] : alert [General Appearance - In No Acute Distress] : in no acute distress [Sclera] : the sclera and conjunctiva were normal [Outer Ear] : the ears and nose were normal in appearance [FreeTextEntry1] : 1 patch on PS on posterior scalp

## 2024-05-21 NOTE — ASSESSMENT
[FreeTextEntry1] : Joint pain  shoulders, hands clinically consistent with OA and US without inflammatory changes (4-2024)  ->  continue Mobic as needed  -> Will check laboratory tests to look for markers of disease activity and also to assess for medication toxicity. since on Mobic   psoriasis  ->  derm follow up yearly   Knee pain - improved with Mobic and history of bakers cyst.  Consider re-images if worsens  history of myopathy (Steroid myopathy)  check CK has never been on statin but this is not a contraindication to statin use if needed  PMR (5706-5101) ->  not currently active - check esr/crp   Osteoporosis  ->  risk factors - family history, etoh use, steroid use ([id number]) ->  calcium and vitamin d d/w patient  ->  Prolia and bisphos d/w patient and she would like another dexa first.  given that last dexa in 9-2022 and she has not been on therapy and was on steroids it is reasonable to get another dexa for baseline prior to starting therapy. -> ordered dexa for 9-2024    More than 50% of the encounter was spent counseling SERJIO FLOOD on the differential, workup, disease course, and treatment/management.   Education was provided to SERJIO FLOOD during this encounter. All questions and concerns were answered and addressed in detail.  SERJIO FLOOD verbalized understanding and agreed to the plan.   SERJIO FLOOD has been instructed to call for an earlier appointment if new symptoms develop in the interim. SERJIO FLOOD has been instructed to make a follow-up appointment in September 2024

## 2024-05-21 NOTE — DATA REVIEWED
[FreeTextEntry1] : Laboratory and radiology studies that were personally reviewed at today's visit are summarized in above and below: 2024: US hands - no IA changed, left hand with ganglion cyst at   2022:  Duplex - R bakers cyst 2x0.6x2.2 cm (Dr. Oreilly's note) Previously treated at [facility] (Kay Chandra MD) and changing to this office  [records from Dr. Chandra reviewed -  medication  mobic 7.5 mg - 2 tabs in the am  likey PMR in the past but not recently with active disease.  working with orthopedics for shoulder disease  + HLAB27 g6pd = 18 8-2-23 - esr = 50,  + WENDY with negative subsets knee xray - OA hand xray - OA Right shoulder MRI - moderate rtc tendinosis with mild to moderate subacromiasubdeltoid bursitis.  tendinosis and partial thickness interstitial tearing of the intra-articular segment biceps tendon.  mild biceps tenosynovitis within the bicipital groove.  synovitis in the rotator cuff interval.  mild to moderate acromo-clavicular arthropathy.  10- labs - esr = 29, normal Cr, CK, aldolase  tried diclofenac but it bother her stomach  on mobic since 2023]

## 2024-05-21 NOTE — HISTORY OF PRESENT ILLNESS
[FreeTextEntry1] : SERJIO FLOOD is a 63 year  old female, seen on today  for  PMR (dx in 5-2022 and off steroids currently) - not active  Spondyloarthopathy (HLAB27+) => psoriasis Currently on meloxicam 7.5 mg every 5 days and it helps alot with all of her joint pain for the first 3-4 days has some increased pain after eating chineese food last night.  left knee (posterior) is a site of pain and she was told in the past she has small bakers cyst.  Has HLAB27+: saw derm and was told she has psoriasis on the back of the neck (only site) , no d/c/n/v and no blood in the stool   + WENDY with negative serologies in the past - mild dry eyes, no dry dry mouth, hair is mildly thinner, no sun sensitivity, no chest pain or shortness of breath, 2 MC (had done IVF - one at 6 weeks and one at less than 12 weeks), no DVT or PE,   Has Osteoporosis on bone density and has not tried any medications yet.  hasn't been taking calcium  => offered medications and deferred until after next dexa in 9-2024.

## 2024-07-24 DIAGNOSIS — M35.3 POLYMYALGIA RHEUMATICA: ICD-10-CM

## 2024-07-24 DIAGNOSIS — M25.50 PAIN IN UNSPECIFIED JOINT: ICD-10-CM

## 2024-09-11 ENCOUNTER — APPOINTMENT (OUTPATIENT)
Dept: INTERNAL MEDICINE | Facility: CLINIC | Age: 64
End: 2024-09-11
Payer: COMMERCIAL

## 2024-09-11 VITALS
BODY MASS INDEX: 21.37 KG/M2 | SYSTOLIC BLOOD PRESSURE: 130 MMHG | DIASTOLIC BLOOD PRESSURE: 80 MMHG | HEART RATE: 66 BPM | OXYGEN SATURATION: 98 % | WEIGHT: 106 LBS | HEIGHT: 59 IN

## 2024-09-11 VITALS — DIASTOLIC BLOOD PRESSURE: 80 MMHG | SYSTOLIC BLOOD PRESSURE: 120 MMHG

## 2024-09-11 DIAGNOSIS — M81.0 AGE-RELATED OSTEOPOROSIS W/OUT CURRENT PATHOLOGICAL FRACTURE: ICD-10-CM

## 2024-09-11 DIAGNOSIS — Z12.31 ENCOUNTER FOR SCREENING MAMMOGRAM FOR MALIGNANT NEOPLASM OF BREAST: ICD-10-CM

## 2024-09-11 DIAGNOSIS — L40.9 PSORIASIS, UNSPECIFIED: ICD-10-CM

## 2024-09-11 DIAGNOSIS — R73.09 OTHER ABNORMAL GLUCOSE: ICD-10-CM

## 2024-09-11 DIAGNOSIS — R92.30 DENSE BREASTS, UNSPECIFIED: ICD-10-CM

## 2024-09-11 DIAGNOSIS — M35.3 POLYMYALGIA RHEUMATICA: ICD-10-CM

## 2024-09-11 PROCEDURE — 99214 OFFICE O/P EST MOD 30 MIN: CPT

## 2024-09-11 PROCEDURE — G2211 COMPLEX E/M VISIT ADD ON: CPT | Mod: NC

## 2024-09-11 NOTE — HISTORY OF PRESENT ILLNESS
[FreeTextEntry1] : f/u PMR [de-identified] :   polymyalgia rheum- reviewed 4/9/24, 5/21/24 Rheum notes.  reviewed 4/9/24 labs - CRP 8 hip pain-, shoulder pain resolved.  meloxicam- last used 1 mo ago reviewed 9/11/23 MRI shoulder- mod tendinosis, subacrom. / subdeltoid bursitis.  will f/u w ophth.  no chg in vision excpt looking at the computer leg pain- reviewed 9/15/23 Vascular notes- prob sciatica- only when she drives for prolonged periods. reviewed 2/7/23 US art- mild atherosclerosis- rec statin but pt not taking statin b/c worried about myalg psoarisis Osteoporosis- ordered DEXA

## 2024-10-22 ENCOUNTER — APPOINTMENT (OUTPATIENT)
Dept: DERMATOLOGY | Facility: CLINIC | Age: 64
End: 2024-10-22
Payer: COMMERCIAL

## 2024-10-22 VITALS — WEIGHT: 105 LBS | BODY MASS INDEX: 21.17 KG/M2 | HEIGHT: 59 IN

## 2024-10-22 DIAGNOSIS — Z12.83 ENCOUNTER FOR SCREENING FOR MALIGNANT NEOPLASM OF SKIN: ICD-10-CM

## 2024-10-22 DIAGNOSIS — L57.0 ACTINIC KERATOSIS: ICD-10-CM

## 2024-10-22 DIAGNOSIS — L40.9 PSORIASIS, UNSPECIFIED: ICD-10-CM

## 2024-10-22 PROCEDURE — 99204 OFFICE O/P NEW MOD 45 MIN: CPT | Mod: 25

## 2024-10-22 PROCEDURE — 17000 DESTRUCT PREMALG LESION: CPT

## 2024-10-22 PROCEDURE — 17003 DESTRUCT PREMALG LES 2-14: CPT

## 2024-10-24 ENCOUNTER — RX RENEWAL (OUTPATIENT)
Age: 64
End: 2024-10-24

## 2024-12-09 ENCOUNTER — APPOINTMENT (OUTPATIENT)
Dept: RHEUMATOLOGY | Facility: CLINIC | Age: 64
End: 2024-12-09
Payer: COMMERCIAL

## 2024-12-09 VITALS
SYSTOLIC BLOOD PRESSURE: 118 MMHG | HEIGHT: 59 IN | WEIGHT: 106 LBS | HEART RATE: 66 BPM | OXYGEN SATURATION: 98 % | BODY MASS INDEX: 21.37 KG/M2 | DIASTOLIC BLOOD PRESSURE: 72 MMHG

## 2024-12-09 DIAGNOSIS — M25.50 PAIN IN UNSPECIFIED JOINT: ICD-10-CM

## 2024-12-09 DIAGNOSIS — M81.0 AGE-RELATED OSTEOPOROSIS W/OUT CURRENT PATHOLOGICAL FRACTURE: ICD-10-CM

## 2024-12-09 DIAGNOSIS — M19.90 UNSPECIFIED OSTEOARTHRITIS, UNSPECIFIED SITE: ICD-10-CM

## 2024-12-09 DIAGNOSIS — M35.3 POLYMYALGIA RHEUMATICA: ICD-10-CM

## 2024-12-09 PROCEDURE — G2211 COMPLEX E/M VISIT ADD ON: CPT | Mod: NC

## 2024-12-09 PROCEDURE — 99214 OFFICE O/P EST MOD 30 MIN: CPT

## 2024-12-19 ENCOUNTER — APPOINTMENT (OUTPATIENT)
Dept: DERMATOLOGY | Facility: CLINIC | Age: 64
End: 2024-12-19
Payer: COMMERCIAL

## 2024-12-19 VITALS — HEIGHT: 59 IN | BODY MASS INDEX: 21.37 KG/M2 | WEIGHT: 106 LBS

## 2024-12-19 DIAGNOSIS — Z87.2 PERSONAL HISTORY OF DISEASES OF THE SKIN AND SUBCUTANEOUS TISSUE: ICD-10-CM

## 2024-12-19 DIAGNOSIS — T14.8XXA OTHER INJURY OF UNSPECIFIED BODY REGION, INITIAL ENCOUNTER: ICD-10-CM

## 2024-12-19 DIAGNOSIS — H02.89 OTHER SPECIFIED DISORDERS OF EYELID: ICD-10-CM

## 2024-12-19 DIAGNOSIS — L57.0 ACTINIC KERATOSIS: ICD-10-CM

## 2024-12-19 DIAGNOSIS — L40.9 PSORIASIS, UNSPECIFIED: ICD-10-CM

## 2024-12-19 PROCEDURE — 99214 OFFICE O/P EST MOD 30 MIN: CPT | Mod: 25

## 2024-12-19 PROCEDURE — 17000 DESTRUCT PREMALG LESION: CPT

## 2024-12-24 PROBLEM — F10.90 ALCOHOL USE: Status: ACTIVE | Noted: 2017-12-08

## 2025-02-03 ENCOUNTER — APPOINTMENT (OUTPATIENT)
Dept: INTERNAL MEDICINE | Facility: CLINIC | Age: 65
End: 2025-02-03

## 2025-04-28 ENCOUNTER — RX RENEWAL (OUTPATIENT)
Age: 65
End: 2025-04-28

## 2025-06-09 ENCOUNTER — NON-APPOINTMENT (OUTPATIENT)
Age: 65
End: 2025-06-09

## 2025-06-16 ENCOUNTER — APPOINTMENT (OUTPATIENT)
Dept: RHEUMATOLOGY | Facility: CLINIC | Age: 65
End: 2025-06-16
Payer: COMMERCIAL

## 2025-06-16 VITALS
OXYGEN SATURATION: 96 % | SYSTOLIC BLOOD PRESSURE: 115 MMHG | HEART RATE: 73 BPM | DIASTOLIC BLOOD PRESSURE: 74 MMHG | WEIGHT: 108 LBS | BODY MASS INDEX: 21.77 KG/M2 | HEIGHT: 59 IN

## 2025-06-16 PROCEDURE — 99214 OFFICE O/P EST MOD 30 MIN: CPT

## 2025-06-16 PROCEDURE — G2211 COMPLEX E/M VISIT ADD ON: CPT | Mod: NC

## 2025-07-08 ENCOUNTER — APPOINTMENT (OUTPATIENT)
Dept: INTERNAL MEDICINE | Facility: CLINIC | Age: 65
End: 2025-07-08
Payer: COMMERCIAL

## 2025-07-08 VITALS
WEIGHT: 111 LBS | HEART RATE: 59 BPM | BODY MASS INDEX: 22.42 KG/M2 | TEMPERATURE: 98 F | OXYGEN SATURATION: 100 % | SYSTOLIC BLOOD PRESSURE: 125 MMHG | DIASTOLIC BLOOD PRESSURE: 74 MMHG

## 2025-07-08 PROBLEM — Z87.39 HISTORY OF MUSCLE PAIN: Status: RESOLVED | Noted: 2022-06-13 | Resolved: 2025-07-08

## 2025-07-08 PROBLEM — R09.89 DECREASED PEDAL PULSES: Status: RESOLVED | Noted: 2022-11-18 | Resolved: 2025-07-08

## 2025-07-08 PROBLEM — T14.8XXA EXCORIATION: Status: RESOLVED | Noted: 2024-12-19 | Resolved: 2025-07-08

## 2025-07-08 PROBLEM — M75.50 SUBDELTOID BURSITIS: Status: RESOLVED | Noted: 2023-12-22 | Resolved: 2025-07-08

## 2025-07-08 PROBLEM — Z87.898 HISTORY OF PERSISTENT COUGH: Status: RESOLVED | Noted: 2020-03-04 | Resolved: 2025-07-08

## 2025-07-08 PROBLEM — M25.511 SHOULDER PAIN, BILATERAL: Status: RESOLVED | Noted: 2023-07-10 | Resolved: 2025-07-08

## 2025-07-08 PROBLEM — R21 RASH: Status: RESOLVED | Noted: 2023-10-25 | Resolved: 2025-07-08

## 2025-07-08 PROBLEM — M25.512 SHOULDER PAIN, LEFT: Status: RESOLVED | Noted: 2022-05-19 | Resolved: 2025-07-08

## 2025-07-08 PROBLEM — Z87.2 HISTORY OF PSORIASIS: Status: RESOLVED | Noted: 2024-05-21 | Resolved: 2025-07-08

## 2025-07-08 PROBLEM — H02.89 IRRITATION OF EYELID: Status: RESOLVED | Noted: 2024-12-19 | Resolved: 2025-07-08

## 2025-07-08 PROBLEM — M79.605 PAIN OF LEFT LOWER EXTREMITY: Status: RESOLVED | Noted: 2018-07-03 | Resolved: 2025-07-08

## 2025-07-08 PROBLEM — Z13.6 SCREENING FOR HEART DISEASE: Status: RESOLVED | Noted: 2023-08-23 | Resolved: 2025-07-08

## 2025-07-08 PROCEDURE — 93000 ELECTROCARDIOGRAM COMPLETE: CPT

## 2025-07-08 PROCEDURE — 99396 PREV VISIT EST AGE 40-64: CPT

## 2025-07-08 PROCEDURE — G0537: CPT
